# Patient Record
Sex: FEMALE | Race: WHITE | NOT HISPANIC OR LATINO | Employment: UNEMPLOYED | URBAN - NONMETROPOLITAN AREA
[De-identification: names, ages, dates, MRNs, and addresses within clinical notes are randomized per-mention and may not be internally consistent; named-entity substitution may affect disease eponyms.]

---

## 2018-07-25 PROCEDURE — 88141 CYTOPATH C/V INTERPRET: CPT | Performed by: PATHOLOGY

## 2018-07-25 PROCEDURE — G0123 SCREEN CERV/VAG THIN LAYER: HCPCS

## 2018-07-25 PROCEDURE — G0123 SCREEN CERV/VAG THIN LAYER: HCPCS | Performed by: NURSE PRACTITIONER

## 2018-07-27 ENCOUNTER — LAB REQUISITION (OUTPATIENT)
Dept: LAB | Facility: HOSPITAL | Age: 33
End: 2018-07-27

## 2018-07-27 DIAGNOSIS — Z01.419 ENCOUNTER FOR GYNECOLOGICAL EXAMINATION WITHOUT ABNORMAL FINDING: ICD-10-CM

## 2018-07-30 LAB
LAB AP CASE REPORT: NORMAL
LAB AP GYN ADDITIONAL INFORMATION: NORMAL
LAB AP GYN OTHER FINDINGS: NORMAL
LAB AP LMP: NORMAL
PATH INTERP SPEC-IMP: NORMAL
STAT OF ADQ CVX/VAG CYTO-IMP: NORMAL

## 2018-09-20 ENCOUNTER — OFFICE VISIT (OUTPATIENT)
Dept: GASTROENTEROLOGY | Facility: CLINIC | Age: 33
End: 2018-09-20

## 2018-09-20 VITALS
HEART RATE: 81 BPM | SYSTOLIC BLOOD PRESSURE: 112 MMHG | WEIGHT: 117 LBS | HEIGHT: 63 IN | DIASTOLIC BLOOD PRESSURE: 76 MMHG | BODY MASS INDEX: 20.73 KG/M2 | OXYGEN SATURATION: 98 %

## 2018-09-20 DIAGNOSIS — Z80.0 FAMILY HISTORY OF COLON CANCER IN MOTHER: ICD-10-CM

## 2018-09-20 DIAGNOSIS — K58.0 IRRITABLE BOWEL SYNDROME WITH DIARRHEA: ICD-10-CM

## 2018-09-20 DIAGNOSIS — Z12.11 ENCOUNTER FOR SCREENING FOR MALIGNANT NEOPLASM OF COLON: Primary | ICD-10-CM

## 2018-09-20 PROCEDURE — 99214 OFFICE O/P EST MOD 30 MIN: CPT | Performed by: NURSE PRACTITIONER

## 2018-09-20 RX ORDER — DEXTROSE AND SODIUM CHLORIDE 5; .45 G/100ML; G/100ML
30 INJECTION, SOLUTION INTRAVENOUS CONTINUOUS PRN
Status: CANCELLED | OUTPATIENT
Start: 2018-11-06

## 2018-09-20 RX ORDER — HYOSCYAMINE SULFATE 0.125 MG
0.12 TABLET ORAL
Qty: 120 TABLET | Refills: 5 | Status: SHIPPED | OUTPATIENT
Start: 2018-09-20 | End: 2019-01-11

## 2018-09-20 NOTE — PROGRESS NOTES
Chief Complaint   Patient presents with   • Colon Cancer Screening       Subjective    Gisella Aldana is a 33 y.o. female. she is here today for follow-up.    History of Present Illness  33-year-old female presents to discuss screening colonoscopy due to strong family history of colon cancer.  Her mother was diagnosed at age 34.  She denies any abdominal pain nausea or vomiting.  Reports she has ear bowel syndrome and has diarrhea after every meal.  She denies any melena or hematochezia.  She denies any heartburn reflux or dysphagia.  Her weight is stable.  Plan; we'll start patient on Levsin for meals and bedtime for irritable bowel syndrome diarrhea predominant.  Schedule patient for screening colonoscopy due to family history of colon cancer in her mother.  Follow-up after test return to office sooner if needed.       The following portions of the patient's history were reviewed and updated as appropriate:   Past Medical History:   Diagnosis Date   • Encounter for  visit      visit status      • Surgical follow-up care    • Vomiting of pregnancy    • Vomiting of pregnancy, unspecified     Unspecified vomiting of pregnancy, antepartum condition or complication        Past Surgical History:   Procedure Laterality Date   •  SECTION  2015     Repeat low transverse  section.      Family History   Problem Relation Age of Onset   • Cancer Mother         colon cancer   • Cancer Father         Lung cancer   • Hypertension Paternal Grandmother    • Cancer Maternal Grandmother         lymphoma   • Heart disease Paternal Grandfather      OB History      Para Term  AB Living    2 2       2    SAB TAB Ectopic Molar Multiple Live Births                       Current Outpatient Prescriptions   Medication Sig Dispense Refill   • hyoscyamine (ANASPAZ,LEVSIN) 0.125 MG tablet Take 1 tablet by mouth 4 (Four) Times a Day With Meals & at Bedtime. 120 tablet 5   • polyethylene  "glycol (GoLYTELY) 236 g solution Starting at noon on day prior to procedure, drink 8 ounces every 30 minutes until all gone or stools are clear. May add flavor packet. 4000 mL 0     No current facility-administered medications for this visit.      No Known Allergies  Social History     Social History   • Marital status:      Social History Main Topics   • Smoking status: Never Smoker   • Smokeless tobacco: Never Used   • Alcohol use No   • Drug use: No   • Sexual activity: Yes     Birth control/ protection: None     Other Topics Concern   • Not on file       Review of Systems  Review of Systems   Constitutional: Negative for activity change, appetite change, chills, diaphoresis, fatigue, fever and unexpected weight change.   HENT: Negative for sore throat and trouble swallowing.    Respiratory: Negative for shortness of breath.    Gastrointestinal: Positive for diarrhea (5x per day after meals ). Negative for abdominal distention, abdominal pain, anal bleeding, blood in stool, constipation, nausea, rectal pain and vomiting.   Musculoskeletal: Negative for arthralgias.   Skin: Negative for pallor.   Neurological: Negative for light-headedness.        /76   Pulse 81   Ht 160 cm (63\")   Wt 53.1 kg (117 lb)   SpO2 98%   BMI 20.73 kg/m²     Objective    Physical Exam   Constitutional: She is oriented to person, place, and time. She appears well-developed and well-nourished. She is cooperative. No distress.   HENT:   Head: Normocephalic and atraumatic.   Neck: Normal range of motion. Neck supple. No thyromegaly present.   Cardiovascular: Normal rate, regular rhythm and normal heart sounds.    Pulmonary/Chest: Effort normal and breath sounds normal. She has no wheezes. She has no rhonchi. She has no rales.   Abdominal: Soft. Normal appearance and bowel sounds are normal. She exhibits no distension. There is no hepatosplenomegaly. There is no tenderness. There is no rigidity and no guarding. No hernia. "   Lymphadenopathy:     She has no cervical adenopathy.   Neurological: She is alert and oriented to person, place, and time.   Skin: Skin is warm, dry and intact. No rash noted. No pallor.   Psychiatric: She has a normal mood and affect. Her speech is normal.     Lab Requisition on 07/25/2018   Component Date Value Ref Range Status   • Case Report 07/25/2018    Final                    Value:Gynecologic Cytology Report                       Case: [id number]                                  Authorizing Provider:  Submitter Client           Collected:           07/25/2018 08:40 AM          First Screen:          Alexa Reagan         Received:            07/27/2018 08:41 AM          Pathologist:           Herson Dunham MD                                                          Specimen:    Liquid-Based Pap, Screening, Cervix, Endocervix                                           • Interpretation 07/25/2018 Negative for intraepithelial lesion or malignancy    Final   • Other Findings 07/25/2018 Reactive cellular changes  Fungal organisms morphologically consistent with Candida spp  Mild inflammation   Final   • Specimen Adequacy 07/25/2018 Satisfactory for evaluation, endocervical/transformation zone component present   Final   • LMP 07/25/2018 07-   Final   • Additional Information 07/25/2018    Final                    Value:This result contains rich text formatting which cannot be displayed here.     Assessment/Plan      1. Encounter for screening for malignant neoplasm of colon    2. Family history of colon cancer in mother    3. Irritable bowel syndrome with diarrhea    .       Orders placed during this encounter include:  Orders Placed This Encounter   Procedures   • Follow Anesthesia Guidelines / Standing Orders     Standing Status:   Future       COLONOSCOPY (N/A)    Review and/or summary of lab tests, radiology, procedures, medications. Review and summary of old records and obtaining of history.  The risks and benefits of my recommendations, as well as other treatment options were discussed with the patient today. Questions were answered.    New Medications Ordered This Visit   Medications   • hyoscyamine (ANASPAZ,LEVSIN) 0.125 MG tablet     Sig: Take 1 tablet by mouth 4 (Four) Times a Day With Meals & at Bedtime.     Dispense:  120 tablet     Refill:  5   • polyethylene glycol (GoLYTELY) 236 g solution     Sig: Starting at noon on day prior to procedure, drink 8 ounces every 30 minutes until all gone or stools are clear. May add flavor packet.     Dispense:  4000 mL     Refill:  0       Follow-up: Return in about 4 weeks (around 10/18/2018).          This document has been electronically signed by CANDE Vogel on September 20, 2018 2:08 PM             Results for orders placed or performed in visit on 07/25/18   Liquid-based Pap Smear, Screening   Result Value Ref Range    Case Report       Gynecologic Cytology Report                       Case: [id number]                                  Authorizing Provider:  Submitter Client           Collected:           07/25/2018 08:40 AM          First Screen:          Alexa Reagan         Received:            07/27/2018 08:41 AM          Pathologist:           Herson Dunhma MD                                                          Specimen:    Liquid-Based Pap, Screening, Cervix, Endocervix                                            Interpretation Negative for intraepithelial lesion or malignancy      Other Findings       Reactive cellular changes  Fungal organisms morphologically consistent with Candida spp  Mild inflammation    Specimen Adequacy       Satisfactory for evaluation, endocervical/transformation zone component present    [facility] 07-     Additional Information       Disclaimer: Cervical cytology is a screening test primarily for squamous cancer and its precursors and has associated false-negative and false-positive results.   Technologies such as liquid-based preparations may decrease but will not eliminate all false-negative results.  Follow-up of unexplained clinical signs and symptoms is recommended to minimize false-negative results. (The [place] System for Reporting Cervical Cytology: Gonzalez, 2015).     Results for orders placed or performed during the hospital encounter of 05/11/15   Cannabinoid Confirmation, Ur   Result Value Ref Range    Please note Comment     THC, Urine Positive (A)     Carboxy THC, Urine 83 CUTOFF=10 ng/mL   Hemoglobin and hematocrit, blood   Result Value Ref Range    Hemoglobin 9.8 (L) 12.0 - 15.5 gm/dl    Hematocrit 27.8 (L) 35.0 - 45.0 %   Rapid drug screen, urine   Result Value Ref Range    Barbiturates Screen, Urine Negative NEGATIVE    Benzodiazepine Screen, Urine Negative NEGATIVE    Opiate Screen, Urine Negative NEGATIVE    THC Screen Interpretation POSITIVE (A) NEGATIVE    Amphet/Methamphet, Screen, Urine Negative NEGATIVE    Cocaine Screen, Urine Negative NEGATIVE    Oxycodone Screen, Urine Negative NEGATIVE    Methadone Screen, Urine Negative NEGATIVE   RPR   Result Value Ref Range    RPR Non Reactive Non Reactive   CBC and Differential   Result Value Ref Range    WBC 16.1 (H) 3.2 - 9.8 x1000/uL    RBC 4.17 3.77 - 5.16 amelia/mm3    Hemoglobin 11.9 (L) 12.0 - 15.5 gm/dl    Hematocrit 34.6 (L) 35.0 - 45.0 %    MCV 83.0 80.0 - 98.0 fl    MCH 28.5 26.0 - 34.0 pg    MCHC 34.4 31.4 - 36.0 gm/dl    RDW 14.0 11.5 - 14.5 %    Platelets 319 150 - 450 x1000/mm3    MPV 11.8 8.0 - 12.0 fl    Neutrophil Rel % 67.4 37.0 - 80.0 %    Lymphocyte Rel % 25.3 10.0 - 50.0 %    Monocyte Rel % 5.1 0.0 - 12.0 %    Eosinophil Rel % 1.3 0.0 - 7.0 %    Basophil Rel % 0.2 0.0 - 2.0 %    Immature Granulocyte Rel % 0.70 (H) 0.00 - 0.50 %    Neutrophils Absolute 10.87 (H) 2.00 - 8.60 x1000/uL    Lymphocytes Absolute 4.09 0.60 - 4.20 x1000/uL    Monocytes Absolute 0.82 0.00 - 0.90 x1000/uL    Eosinophils Absolute 0.21 0.00 - 0.70  x1000/uL    Basophils Absolute 0.03 0.00 - 0.20 x1000/uL    Immature Granulocytes Absolute 0.120 (H) 0.005 - 0.022 x1000/uL   Type and screen   Result Value Ref Range    DOES A PREVIOUS ABORH EXIST? PREVIOUS TYPE ON FILE     ABO Type B     RH type POS     Antibody Screen Interpretation NEG    Results for orders placed or performed in visit on 02/09/15   Glucose, Post Prandial 1 Hr   Result Value Ref Range    Glucose, 1Hr  60 - 140 mg/dl   CBC and Differential   Result Value Ref Range    WBC 15.9 (H) 3.2 - 9.8 x1000/uL    RBC 3.67 (L) 3.77 - 5.16 amelia/mm3    Hemoglobin 11.2 (L) 12.0 - 15.5 gm/dl    Hematocrit 32.8 (L) 35.0 - 45.0 %    MCV 89.4 80.0 - 98.0 fl    MCH 30.5 26.0 - 34.0 pg    MCHC 34.1 31.4 - 36.0 gm/dl    RDW 13.0 11.5 - 14.5 %    Platelets 305 150 - 450 x1000/mm3    MPV 11.5 8.0 - 12.0 fl    Neutrophil Rel % 75.4 37.0 - 80.0 %    Lymphocyte Rel % 17.7 10.0 - 50.0 %    Monocyte Rel % 4.7 0.0 - 12.0 %    Eosinophil Rel % 1.6 0.0 - 7.0 %    Basophil Rel % 0.1 0.0 - 2.0 %    Immature Granulocyte Rel % 0.50 0.00 - 0.50 %    Neutrophils Absolute 11.97 (H) 2.00 - 8.60 x1000/uL    Lymphocytes Absolute 2.81 0.60 - 4.20 x1000/uL    Monocytes Absolute 0.74 0.00 - 0.90 x1000/uL    Eosinophils Absolute 0.25 0.00 - 0.70 x1000/uL    Basophils Absolute 0.02 0.00 - 0.20 x1000/uL    Immature Granulocytes Absolute 0.080 (H) 0.005 - 0.022 x1000/uL   Results for orders placed or performed in visit on 10/10/14   Cannabinoid Confirmation, Ur   Result Value Ref Range    Please note Comment     THC, Urine Positive (A)     Carboxy THC, Urine >300 CUTOFF=10 ng/mL   Urinalysis, Microscopic only   Result Value Ref Range    WBC, UA 10-15 (A) 0-5,0,RARE,1-2,2-4,3-5  /HPF    RBC, UA 0-2 0-2,0,RARE  /HPF    Bacteria, UA TRACE 0,TRACE  /HPF    Mucus, UA 1+     Epithelial Cells, UA Rare Squamous  /HPF    Crystals, Joint Fluid 1+ Amorphous Phosphates (C)  /HPF   Hepatitis C antibody   Result Value Ref Range    Hep C Virus Ab  Negative NEGATIVE   Rapid drug screen, urine   Result Value Ref Range    Barbiturates Screen, Urine Negative NEGATIVE    Benzodiazepine Screen, Urine Negative NEGATIVE    Opiate Screen, Urine Negative NEGATIVE    THC Screen Interpretation POSITIVE (A) NEGATIVE    Amphet/Methamphet, Screen, Urine Negative NEGATIVE    Cocaine Screen, Urine Negative NEGATIVE    Oxycodone Screen, Urine Negative NEGATIVE    Methadone Screen, Urine Negative NEGATIVE   Rubella antibody, IgG   Result Value Ref Range    Rubella IgG Quant Immune IMMUNE   RPR   Result Value Ref Range    RPR Non Reactive Non Reactive   HIV-1 and HIV-2 antibodies   Result Value Ref Range    HIV-1/ HIV-2 Non Reactive NON REACTIVE   Hepatitis B surface antigen   Result Value Ref Range    Hepatitis B Surface Ag Negative NEGATIVE   Urinalysis with microscopic   Result Value Ref Range    Color, UA CLAY     Appearance TURBID     Specific Gravity, UA 1.022 1.003 - 1.030    pH, UA 7.5 pH Units    Leukocytes, UA 1+ (A) NEGATIVE    Nitrite, UA NEGATIVE NEGATIVE    Protein, UA 1+ (A) NEGATIVE    Glucose, Urine NEGATIVE NEGATIVE mg/dl    Ketones, UA TRACE (A) NEGATIVE    Urobilinogen, UA 0.2 0.2 EU/dl    Blood, UA NEGATIVE NEGATIVE     *Note: Due to a large number of results and/or encounters for the requested time period, some results have not been displayed. A complete set of results can be found in Results Review.

## 2018-09-20 NOTE — PATIENT INSTRUCTIONS
Diet for Irritable Bowel Syndrome  When you have irritable bowel syndrome (IBS), the foods you eat and your eating habits are very important. IBS may cause various symptoms, such as abdominal pain, constipation, or diarrhea. Choosing the right foods can help ease discomfort caused by these symptoms. Work with your health care provider and dietitian to find the best eating plan to help control your symptoms.  What general guidelines do I need to follow?  · Keep a food diary. This will help you identify foods that cause symptoms. Write down:  ? What you eat and when.  ? What symptoms you have.  ? When symptoms occur in relation to your meals.  · Avoid foods that cause symptoms. Talk with your dietitian about other ways to get the same nutrients that are in these foods.  · Eat more foods that contain fiber. Take a fiber supplement if directed by your dietitian.  · Eat your meals slowly, in a relaxed setting.  · Aim to eat 5-6 small meals per day. Do not skip meals.  · Drink enough fluids to keep your urine clear or pale yellow.  · Ask your health care provider if you should take an over-the-counter probiotic during flare-ups to help restore healthy gut bacteria.  · If you have cramping or diarrhea, try making your meals low in fat and high in carbohydrates. Examples of carbohydrates are pasta, rice, whole grain breads and cereals, fruits, and vegetables.  · If dairy products cause your symptoms to flare up, try eating less of them. You might be able to handle yogurt better than other dairy products because it contains bacteria that help with digestion.  What foods are not recommended?  The following are some foods and drinks that may worsen your symptoms:  · Fatty foods, such as French fries.  · Milk products, such as cheese or ice cream.  · Chocolate.  · Alcohol.  · Products with caffeine, such as coffee.  · Carbonated drinks, such as soda.    The items listed above may not be a complete list of foods and beverages to  avoid. Contact your dietitian for more information.  What foods are good sources of fiber?  Your health care provider or dietitian may recommend that you eat more foods that contain fiber. Fiber can help reduce constipation and other IBS symptoms. Add foods with fiber to your diet a little at a time so that your body can get used to them. Too much fiber at once might cause gas and swelling of your abdomen. The following are some foods that are good sources of fiber:  · Apples.  · Peaches.  · Pears.  · Berries.  · Figs.  · Broccoli (raw).  · Cabbage.  · Carrots.  · Raw peas.  · Kidney beans.  · Lima beans.  · Whole grain bread.  · Whole grain cereal.    Where to find more information:  International Foundation for Functional Gastrointestinal Disorders: www.iffgd.org  National [place] of Diabetes and Digestive and Kidney Diseases: www.niddk.nih.gov/health-information/health-topics/digestive-diseases/ibs/Pages/facts.aspx  This information is not intended to replace advice given to you by your health care provider. Make sure you discuss any questions you have with your health care provider.  Document Released: 03/09/2005 Document Revised: 05/25/2017 Document Reviewed: 03/20/2015  [employer] Interactive Patient Education © 2018 [employer]

## 2018-11-07 ENCOUNTER — ANESTHESIA EVENT (OUTPATIENT)
Dept: GASTROENTEROLOGY | Facility: HOSPITAL | Age: 33
End: 2018-11-07

## 2018-11-07 ENCOUNTER — HOSPITAL ENCOUNTER (OUTPATIENT)
Facility: HOSPITAL | Age: 33
Setting detail: HOSPITAL OUTPATIENT SURGERY
Discharge: HOME | End: 2018-11-07
Attending: INTERNAL MEDICINE | Admitting: INTERNAL MEDICINE

## 2018-11-07 ENCOUNTER — ANESTHESIA (OUTPATIENT)
Dept: GASTROENTEROLOGY | Facility: HOSPITAL | Age: 33
End: 2018-11-07

## 2018-11-07 VITALS
OXYGEN SATURATION: 96 % | SYSTOLIC BLOOD PRESSURE: 102 MMHG | DIASTOLIC BLOOD PRESSURE: 65 MMHG | HEIGHT: 63 IN | RESPIRATION RATE: 20 BRPM | TEMPERATURE: 96.9 F | HEART RATE: 72 BPM | WEIGHT: 117.95 LBS | BODY MASS INDEX: 20.9 KG/M2

## 2018-11-07 DIAGNOSIS — Z80.0 FAMILY HISTORY OF COLON CANCER IN MOTHER: ICD-10-CM

## 2018-11-07 DIAGNOSIS — Z12.11 ENCOUNTER FOR SCREENING FOR MALIGNANT NEOPLASM OF COLON: ICD-10-CM

## 2018-11-07 LAB — B-HCG UR QL: NEGATIVE

## 2018-11-07 PROCEDURE — 25010000002 PROPOFOL 10 MG/ML EMULSION: Performed by: NURSE ANESTHETIST, CERTIFIED REGISTERED

## 2018-11-07 PROCEDURE — 81025 URINE PREGNANCY TEST: CPT | Performed by: INTERNAL MEDICINE

## 2018-11-07 PROCEDURE — 45378 DIAGNOSTIC COLONOSCOPY: CPT | Performed by: INTERNAL MEDICINE

## 2018-11-07 RX ORDER — DEXTROSE AND SODIUM CHLORIDE 5; .45 G/100ML; G/100ML
30 INJECTION, SOLUTION INTRAVENOUS CONTINUOUS PRN
Status: DISCONTINUED | OUTPATIENT
Start: 2018-11-07 | End: 2018-11-07 | Stop reason: HOSPADM

## 2018-11-07 RX ORDER — LIDOCAINE HYDROCHLORIDE 20 MG/ML
INJECTION, SOLUTION INFILTRATION; PERINEURAL AS NEEDED
Status: DISCONTINUED | OUTPATIENT
Start: 2018-11-07 | End: 2018-11-07 | Stop reason: SURG

## 2018-11-07 RX ORDER — PROPOFOL 10 MG/ML
VIAL (ML) INTRAVENOUS AS NEEDED
Status: DISCONTINUED | OUTPATIENT
Start: 2018-11-07 | End: 2018-11-07 | Stop reason: SURG

## 2018-11-07 RX ADMIN — DEXTROSE AND SODIUM CHLORIDE 30 ML/HR: 5; 450 INJECTION, SOLUTION INTRAVENOUS at 09:30

## 2018-11-07 RX ADMIN — PROPOFOL 20 MG: 10 INJECTION, EMULSION INTRAVENOUS at 10:31

## 2018-11-07 RX ADMIN — PROPOFOL 20 MG: 10 INJECTION, EMULSION INTRAVENOUS at 10:37

## 2018-11-07 RX ADMIN — DEXTROSE AND SODIUM CHLORIDE: 5; 450 INJECTION, SOLUTION INTRAVENOUS at 10:18

## 2018-11-07 RX ADMIN — PROPOFOL 20 MG: 10 INJECTION, EMULSION INTRAVENOUS at 10:35

## 2018-11-07 RX ADMIN — PROPOFOL 20 MG: 10 INJECTION, EMULSION INTRAVENOUS at 10:33

## 2018-11-07 RX ADMIN — LIDOCAINE HYDROCHLORIDE 100 MG: 20 INJECTION, SOLUTION INFILTRATION; PERINEURAL at 10:29

## 2018-11-07 RX ADMIN — PROPOFOL 20 MG: 10 INJECTION, EMULSION INTRAVENOUS at 10:39

## 2018-11-07 RX ADMIN — PROPOFOL 70 MG: 10 INJECTION, EMULSION INTRAVENOUS at 10:29

## 2018-11-07 NOTE — H&P
Progress Notes  Encounter Date: 2018  Kristofer gregorio  Gastroenterology   Expand All Collapse All    []Manual[]Template  []Copied      Chief Complaint   Patient presents with   • Colon Cancer Screening            Subjective       Gisella Aldana is a 33 y.o. female. she is here today for follow-up.     History of Present Illness  33-year-old female presents to discuss screening colonoscopy due to strong family history of colon cancer.  Her mother was diagnosed at age 34.  She denies any abdominal pain nausea or vomiting.  Reports she has ear bowel syndrome and has diarrhea after every meal.  She denies any melena or hematochezia.  She denies any heartburn reflux or dysphagia.  Her weight is stable.  Plan; we'll start patient on Levsin for meals and bedtime for irritable bowel syndrome diarrhea predominant.  Schedule patient for screening colonoscopy due to family history of colon cancer in her mother.  Follow-up after test return to office sooner if needed.        The following portions of the patient's history were reviewed and updated as appropriate:   Medical History        Past Medical History:   Diagnosis Date   • Encounter for  visit        visit status      • Surgical follow-up care     • Vomiting of pregnancy     • Vomiting of pregnancy, unspecified       Unspecified vomiting of pregnancy, antepartum condition or complication            Surgical History         Past Surgical History:   Procedure Laterality Date   •  SECTION   2015      Repeat low transverse  section.                Family History   Problem Relation Age of Onset   • Cancer Mother           colon cancer   • Cancer Father           Lung cancer   • Hypertension Paternal Grandmother     • Cancer Maternal Grandmother           lymphoma   • Heart disease Paternal Grandfather                 OB History       Para Term  AB Living     2 2       2     SAB TAB Ectopic Molar Multiple Live  "Births                                 Current Outpatient Prescriptions   Medication Sig Dispense Refill   • hyoscyamine (ANASPAZ,LEVSIN) 0.125 MG tablet Take 1 tablet by mouth 4 (Four) Times a Day With Meals & at Bedtime. 120 tablet 5   • polyethylene glycol (GoLYTELY) 236 g solution Starting at noon on day prior to procedure, drink 8 ounces every 30 minutes until all gone or stools are clear. May add flavor packet. 4000 mL 0      No current facility-administered medications for this visit.       No Known Allergies  Social History   Social History           Social History   • Marital status:             Social History Main Topics   • Smoking status: Never Smoker   • Smokeless tobacco: Never Used   • Alcohol use No   • Drug use: No   • Sexual activity: Yes       Birth control/ protection: None           Other Topics Concern   • Not on file            Review of Systems  Review of Systems   Constitutional: Negative for activity change, appetite change, chills, diaphoresis, fatigue, fever and unexpected weight change.   HENT: Negative for sore throat and trouble swallowing.    Respiratory: Negative for shortness of breath.    Gastrointestinal: Positive for diarrhea (5x per day after meals ). Negative for abdominal distention, abdominal pain, anal bleeding, blood in stool, constipation, nausea, rectal pain and vomiting.   Musculoskeletal: Negative for arthralgias.   Skin: Negative for pallor.   Neurological: Negative for light-headedness.                    /76   Pulse 81   Ht 160 cm (63\")   Wt 53.1 kg (117 lb)   SpO2 98%   BMI 20.73 kg/m²         Objective       Physical Exam   Constitutional: She is oriented to person, place, and time. She appears well-developed and well-nourished. She is cooperative. No distress.   HENT:   Head: Normocephalic and atraumatic.   Neck: Normal range of motion. Neck supple. No thyromegaly present.   Cardiovascular: Normal rate, regular rhythm and normal heart sounds.  "   Pulmonary/Chest: Effort normal and breath sounds normal. She has no wheezes. She has no rhonchi. She has no rales.   Abdominal: Soft. Normal appearance and bowel sounds are normal. She exhibits no distension. There is no hepatosplenomegaly. There is no tenderness. There is no rigidity and no guarding. No hernia.   Lymphadenopathy:     She has no cervical adenopathy.   Neurological: She is alert and oriented to person, place, and time.   Skin: Skin is warm, dry and intact. No rash noted. No pallor.   Psychiatric: She has a normal mood and affect. Her speech is normal.               Lab Requisition on 07/25/2018   Component Date Value Ref Range Status    • Case Report 07/25/2018     Final                    Value:Gynecologic Cytology Report                       Case: [id number]                                  Authorizing Provider:  Submitter Client           Collected:           07/25/2018 08:40 AM          First Screen:          Alexa Reagan         Received:            07/27/2018 08:41 AM          Pathologist:           Herson Dunham MD                                                          Specimen:    Liquid-Based Pap, Screening, Cervix, Endocervix                                            • Interpretation 07/25/2018 Negative for intraepithelial lesion or malignancy    Final   • Other Findings 07/25/2018 Reactive cellular changes  Fungal organisms morphologically consistent with Candida spp  Mild inflammation    Final   • Specimen Adequacy 07/25/2018 Satisfactory for evaluation, endocervical/transformation zone component present    Final   • LMP 07/25/2018 07-    Final   • Additional Information 07/25/2018     Final                    Value:This result contains rich text formatting which cannot be displayed here.         Assessment/Plan          1. Encounter for screening for malignant neoplasm of colon    2. Family history of colon cancer in mother    3. Irritable bowel syndrome with  diarrhea    .         Orders placed during this encounter include:        Orders Placed This Encounter   Procedures   • Follow Anesthesia Guidelines / Standing Orders       Standing Status:   Future         COLONOSCOPY (N/A)     Review and/or summary of lab tests, radiology, procedures, medications. Review and summary of old records and obtaining of history. The risks and benefits of my recommendations, as well as other treatment options were discussed with the patient today. Questions were answered.          New Medications Ordered This Visit   Medications   • hyoscyamine (ANASPAZ,LEVSIN) 0.125 MG tablet       Sig: Take 1 tablet by mouth 4 (Four) Times a Day With Meals & at Bedtime.       Dispense:  120 tablet       Refill:  5   • polyethylene glycol (GoLYTELY) 236 g solution       Sig: Starting at noon on day prior to procedure, drink 8 ounces every 30 minutes until all gone or stools are clear. May add flavor packet.       Dispense:  4000 mL       Refill:  0               This document has been electronically signed by Kristofer Maldonado MD on November 7, 2018 9:35 AM                      Results for orders placed or performed in visit on 07/25/18   Liquid-based Pap Smear, Screening   Result Value Ref Range     Case Report           Gynecologic Cytology Report                       Case: [id number]                                  Authorizing Provider:  Submitter Client           Collected:           07/25/2018 08:40 AM          First Screen:          Alexa Reagan         Received:            07/27/2018 08:41 AM          Pathologist:           Herson Dunham MD                                                          Specimen:    Liquid-Based Pap, Screening, Cervix, Endocervix                                              Interpretation Negative for intraepithelial lesion or malignancy       Other Findings           Reactive cellular changes  Fungal organisms morphologically consistent with Candida spp  Mild  inflammation     Specimen Adequacy           Satisfactory for evaluation, endocervical/transformation zone component present     LMP 07-       Additional Information           Disclaimer: Cervical cytology is a screening test primarily for squamous cancer and its precursors and has associated false-negative and false-positive results.  Technologies such as liquid-based preparations may decrease but will not eliminate all false-negative results.  Follow-up of unexplained clinical signs and symptoms is recommended to minimize false-negative results. (The [place] System for Reporting Cervical Cytology: Gonzalez, 2015).      Results for orders placed or performed during the hospital encounter of 05/11/15   Cannabinoid Confirmation, Ur   Result Value Ref Range     Please note Comment       THC, Urine Positive (A)       Carboxy THC, Urine 83 CUTOFF=10 ng/mL   Hemoglobin and hematocrit, blood   Result Value Ref Range     Hemoglobin 9.8 (L) 12.0 - 15.5 gm/dl     Hematocrit 27.8 (L) 35.0 - 45.0 %   Rapid drug screen, urine   Result Value Ref Range     Barbiturates Screen, Urine Negative NEGATIVE     Benzodiazepine Screen, Urine Negative NEGATIVE     Opiate Screen, Urine Negative NEGATIVE     THC Screen Interpretation POSITIVE (A) NEGATIVE     Amphet/Methamphet, Screen, Urine Negative NEGATIVE     Cocaine Screen, Urine Negative NEGATIVE     Oxycodone Screen, Urine Negative NEGATIVE     Methadone Screen, Urine Negative NEGATIVE   RPR   Result Value Ref Range     RPR Non Reactive Non Reactive   CBC and Differential   Result Value Ref Range     WBC 16.1 (H) 3.2 - 9.8 x1000/uL     RBC 4.17 3.77 - 5.16 amelia/mm3     Hemoglobin 11.9 (L) 12.0 - 15.5 gm/dl     Hematocrit 34.6 (L) 35.0 - 45.0 %     MCV 83.0 80.0 - 98.0 fl     MCH 28.5 26.0 - 34.0 pg     MCHC 34.4 31.4 - 36.0 gm/dl     RDW 14.0 11.5 - 14.5 %     Platelets 319 150 - 450 x1000/mm3     MPV 11.8 8.0 - 12.0 fl     Neutrophil Rel % 67.4 37.0 - 80.0 %     Lymphocyte  Rel % 25.3 10.0 - 50.0 %     Monocyte Rel % 5.1 0.0 - 12.0 %     Eosinophil Rel % 1.3 0.0 - 7.0 %     Basophil Rel % 0.2 0.0 - 2.0 %     Immature Granulocyte Rel % 0.70 (H) 0.00 - 0.50 %     Neutrophils Absolute 10.87 (H) 2.00 - 8.60 x1000/uL     Lymphocytes Absolute 4.09 0.60 - 4.20 x1000/uL     Monocytes Absolute 0.82 0.00 - 0.90 x1000/uL     Eosinophils Absolute 0.21 0.00 - 0.70 x1000/uL     Basophils Absolute 0.03 0.00 - 0.20 x1000/uL     Immature Granulocytes Absolute 0.120 (H) 0.005 - 0.022 x1000/uL   Type and screen   Result Value Ref Range     DOES A PREVIOUS ABORH EXIST? PREVIOUS TYPE ON FILE       ABO Type B       RH type POS       Antibody Screen Interpretation NEG     Results for orders placed or performed in visit on 02/09/15   Glucose, Post Prandial 1 Hr   Result Value Ref Range     Glucose, 1Hr  60 - 140 mg/dl   CBC and Differential   Result Value Ref Range     WBC 15.9 (H) 3.2 - 9.8 x1000/uL     RBC 3.67 (L) 3.77 - 5.16 amelia/mm3     Hemoglobin 11.2 (L) 12.0 - 15.5 gm/dl     Hematocrit 32.8 (L) 35.0 - 45.0 %     MCV 89.4 80.0 - 98.0 fl     MCH 30.5 26.0 - 34.0 pg     MCHC 34.1 31.4 - 36.0 gm/dl     RDW 13.0 11.5 - 14.5 %     Platelets 305 150 - 450 x1000/mm3     MPV 11.5 8.0 - 12.0 fl     Neutrophil Rel % 75.4 37.0 - 80.0 %     Lymphocyte Rel % 17.7 10.0 - 50.0 %     Monocyte Rel % 4.7 0.0 - 12.0 %     Eosinophil Rel % 1.6 0.0 - 7.0 %     Basophil Rel % 0.1 0.0 - 2.0 %     Immature Granulocyte Rel % 0.50 0.00 - 0.50 %     Neutrophils Absolute 11.97 (H) 2.00 - 8.60 x1000/uL     Lymphocytes Absolute 2.81 0.60 - 4.20 x1000/uL     Monocytes Absolute 0.74 0.00 - 0.90 x1000/uL     Eosinophils Absolute 0.25 0.00 - 0.70 x1000/uL     Basophils Absolute 0.02 0.00 - 0.20 x1000/uL     Immature Granulocytes Absolute 0.080 (H) 0.005 - 0.022 x1000/uL   Results for orders placed or performed in visit on 10/10/14   Cannabinoid Confirmation, Ur   Result Value Ref Range     Please note Comment       THC, Urine  Positive (A)       Carboxy THC, Urine >300 CUTOFF=10 ng/mL   Urinalysis, Microscopic only   Result Value Ref Range     WBC, UA 10-15 (A) 0-5,0,RARE,1-2,2-4,3-5  /HPF     RBC, UA 0-2 0-2,0,RARE  /HPF     Bacteria, UA TRACE 0,TRACE  /HPF     Mucus, UA 1+       Epithelial Cells, UA Rare Squamous  /HPF     Crystals, Joint Fluid 1+ Amorphous Phosphates (C)  /HPF   Hepatitis C antibody   Result Value Ref Range     Hep C Virus Ab Negative NEGATIVE   Rapid drug screen, urine   Result Value Ref Range     Barbiturates Screen, Urine Negative NEGATIVE     Benzodiazepine Screen, Urine Negative NEGATIVE     Opiate Screen, Urine Negative NEGATIVE     THC Screen Interpretation POSITIVE (A) NEGATIVE     Amphet/Methamphet, Screen, Urine Negative NEGATIVE     Cocaine Screen, Urine Negative NEGATIVE     Oxycodone Screen, Urine Negative NEGATIVE     Methadone Screen, Urine Negative NEGATIVE   Rubella antibody, IgG   Result Value Ref Range     Rubella IgG Quant Immune IMMUNE   RPR   Result Value Ref Range     RPR Non Reactive Non Reactive   HIV-1 and HIV-2 antibodies   Result Value Ref Range     HIV-1/ HIV-2 Non Reactive NON REACTIVE   Hepatitis B surface antigen   Result Value Ref Range     Hepatitis B Surface Ag Negative NEGATIVE   Urinalysis with microscopic   Result Value Ref Range     Color, UA CLAY       Appearance TURBID       Specific Gravity, UA 1.022 1.003 - 1.030     pH, UA 7.5 pH Units     Leukocytes, UA 1+ (A) NEGATIVE     Nitrite, UA NEGATIVE NEGATIVE     Protein, UA 1+ (A) NEGATIVE     Glucose, Urine NEGATIVE NEGATIVE mg/dl     Ketones, UA TRACE (A) NEGATIVE     Urobilinogen, UA 0.2 0.2 EU/dl     Blood, UA NEGATIVE NEGATIVE      *Note: Due to a large number of results and/or encounters for the requested time period, some results have not been displayed. A complete set of results can be found in Results Review.              Office Visit on 9/20/2018            Detailed Report

## 2018-11-07 NOTE — ANESTHESIA POSTPROCEDURE EVALUATION
Patient: Gisella Aldana    Procedure Summary     Date:  11/07/18 Room / Location:  [facility] ENDOSCOPY 3 / [facility] ENDOSCOPY    Anesthesia Start:  1018 Anesthesia Stop:  1039    Procedure:  COLONOSCOPY (N/A ) Diagnosis:       Encounter for screening for malignant neoplasm of colon      Family history of colon cancer in mother      (Encounter for screening for malignant neoplasm of colon [Z12.11])      (Family history of colon cancer in mother [Z80.0])    Surgeon:  Kristofer Maldonado MD Provider:  Sanjiv Houston CRNA    Anesthesia Type:  MAC ASA Status:  2          Anesthesia Type: MAC  Last vitals  BP   126/62 (11/07/18 0920)   Temp   97.3 °F (36.3 °C) (11/07/18 0920)   Pulse   78 (11/07/18 0920)   Resp   18 (11/07/18 0920)     SpO2   100 % (11/07/18 0920)     Post Anesthesia Care and Evaluation    Patient location during evaluation: bedside  Patient participation: waiting for patient participation  Level of consciousness: responsive to physical stimuli  Pain score: 0  Pain management: adequate  Airway patency: patent  Anesthetic complications: No anesthetic complications  PONV Status: none  Cardiovascular status: acceptable  Respiratory status: acceptable  Hydration status: acceptable

## 2018-11-07 NOTE — ANESTHESIA PREPROCEDURE EVALUATION
Anesthesia Evaluation     Patient summary reviewed and Nursing notes reviewed   NPO Solid Status: > 8 hours  NPO Liquid Status: > 8 hours           Airway   Mallampati: II  TM distance: >3 FB  Neck ROM: full  Possible difficult intubation  Dental - normal exam     Pulmonary - normal exam   Cardiovascular - normal exam        Neuro/Psych  GI/Hepatic/Renal/Endo      Musculoskeletal     Abdominal    Substance History      OB/GYN          Other                        Anesthesia Plan    ASA 2     MAC     intravenous induction     Plan discussed with CRNA.

## 2018-11-12 ENCOUNTER — TELEPHONE (OUTPATIENT)
Dept: GASTROENTEROLOGY | Facility: CLINIC | Age: 33
End: 2018-11-12

## 2018-11-12 NOTE — TELEPHONE ENCOUNTER
11/12/2018, Zack Avila, Medical Assistant for the office of [facility] made aware patient telephoned per this staff member [phone] with notification Prior Authorization denial received via facsimile per [employer] stating prescription medication Hyoscyamine Sulfate 0.125 MG Tablets is not a benefit per patient's insurance plan.  Patient made aware this staff member will notify office of [facility] of this denial.  Patient verbalized understanding.

## 2019-01-11 ENCOUNTER — OFFICE VISIT (OUTPATIENT)
Dept: GASTROENTEROLOGY | Facility: CLINIC | Age: 34
End: 2019-01-11

## 2019-01-11 VITALS
OXYGEN SATURATION: 93 % | SYSTOLIC BLOOD PRESSURE: 110 MMHG | BODY MASS INDEX: 19.88 KG/M2 | HEIGHT: 63 IN | WEIGHT: 112.2 LBS | HEART RATE: 91 BPM | DIASTOLIC BLOOD PRESSURE: 82 MMHG

## 2019-01-11 DIAGNOSIS — K58.0 IRRITABLE BOWEL SYNDROME WITH DIARRHEA: Primary | ICD-10-CM

## 2019-01-11 PROCEDURE — 99214 OFFICE O/P EST MOD 30 MIN: CPT | Performed by: NURSE PRACTITIONER

## 2019-01-11 RX ORDER — DICYCLOMINE HCL 20 MG
20 TABLET ORAL
Qty: 90 TABLET | Refills: 5 | Status: SHIPPED | OUTPATIENT
Start: 2019-01-11 | End: 2019-02-07 | Stop reason: HOSPADM

## 2019-01-11 NOTE — PROGRESS NOTES
Chief Complaint   Patient presents with   • Irritable Bowel Syndrome     diarhhea       Subjective    Gisella Aldana is a 33 y.o. female. she is here today for follow-up.    History of Present Illness  33-year-old female presents for colonoscopy follow-up.  She was scheduled in November for follow-up however her father  November 10 and unable to come to any follow-ups.  States she had been doing well she had viral illness with nausea vomiting yesterday and her abdomen still feels mildly sore from that.  She was unable to try Levsin due to cost.  Denies any heartburn reflux or dysphagia.  Her weight is stable.  She has strong family history of colon cancer in her mother.  Colonoscopy was completed 18 and noted poor prep hemorrhoids otherwise normal exam.  Plan; trial of Bentyl for irritable bowel syndrome diarrhea predominant discontinue Levsin due to cost.  Repeat colonoscopy in 1 year due to poor prep and strong family history of colon cancer.  Recheck in 3 months or return to office sooner if needed.     The following portions of the patient's history were reviewed and updated as appropriate:   Past Medical History:   Diagnosis Date   • Encounter for  visit      visit status      • Surgical follow-up care    • Vomiting of pregnancy    • Vomiting of pregnancy, unspecified     Unspecified vomiting of pregnancy, antepartum condition or complication        Past Surgical History:   Procedure Laterality Date   •  SECTION  2015     Repeat low transverse  section.    • COLONOSCOPY N/A 2018    Procedure: COLONOSCOPY;  Surgeon: Kristofer Maldonado MD;  Location: [facility] ENDOSCOPY;  Service: Gastroenterology     Family History   Problem Relation Age of Onset   • Cancer Mother         colon cancer   • Cancer Father         Lung cancer   • Hypertension Paternal Grandmother    • Cancer Maternal Grandmother         lymphoma   • Heart disease Paternal Grandfather      OB  "History      Para Term  AB Living    2 2       2    SAB TAB Ectopic Molar Multiple Live Births                       Current Outpatient Medications   Medication Sig Dispense Refill   • dicyclomine (BENTYL) 20 MG tablet Take 1 tablet by mouth 4 (Four) Times a Day Before Meals & at Bedtime As Needed (cramping) for up to 30 days. 90 tablet 5     No current facility-administered medications for this visit.      No Known Allergies  Social History     Socioeconomic History   • Marital status:      Spouse name: Not on file   • Number of children: Not on file   • Years of education: Not on file   • Highest education level: Not on file   Tobacco Use   • Smoking status: Never Smoker   • Smokeless tobacco: Never Used   Substance and Sexual Activity   • Alcohol use: No   • Drug use: No   • Sexual activity: Yes     Birth control/protection: None       Review of Systems  Review of Systems   Constitutional: Negative for activity change, appetite change, chills, diaphoresis, fatigue, fever and unexpected weight change.   HENT: Negative for sore throat and trouble swallowing.    Respiratory: Negative for shortness of breath.    Gastrointestinal: Positive for abdominal pain. Negative for abdominal distention, anal bleeding, blood in stool, constipation, diarrhea, nausea, rectal pain and vomiting.   Musculoskeletal: Negative for arthralgias.   Skin: Negative for pallor.   Neurological: Negative for light-headedness.        /82 (BP Location: Left arm)   Pulse 91   Ht 160 cm (63\")   Wt 50.9 kg (112 lb 3.2 oz)   SpO2 93%   BMI 19.88 kg/m²     Objective    Physical Exam   Constitutional: She is oriented to person, place, and time. She appears well-developed and well-nourished. She is cooperative. No distress.   HENT:   Head: Normocephalic and atraumatic.   Neck: Normal range of motion. Neck supple. No thyromegaly present.   Cardiovascular: Normal rate, regular rhythm and normal heart sounds. "   Pulmonary/Chest: Effort normal and breath sounds normal. She has no wheezes. She has no rhonchi. She has no rales.   Abdominal: Soft. Normal appearance and bowel sounds are normal. She exhibits no distension. There is no hepatosplenomegaly. There is no tenderness. There is no rigidity and no guarding. No hernia.   Lymphadenopathy:     She has no cervical adenopathy.   Neurological: She is alert and oriented to person, place, and time.   Skin: Skin is warm, dry and intact. No rash noted. No pallor.   Psychiatric: She has a normal mood and affect. Her speech is normal.     Admission on 11/07/2018, Discharged on 11/07/2018   Component Date Value Ref Range Status   • HCG, Urine QL 11/07/2018 Negative  Negative Final     Assessment/Plan      1. Irritable bowel syndrome with diarrhea    .       Orders placed during this encounter include:  No orders of the defined types were placed in this encounter.      * Surgery not found *    Review and/or summary of lab tests, radiology, procedures, medications. Review and summary of old records and obtaining of history. The risks and benefits of my recommendations, as well as other treatment options were discussed with the patient today. Questions were answered.    New Medications Ordered This Visit   Medications   • dicyclomine (BENTYL) 20 MG tablet     Sig: Take 1 tablet by mouth 4 (Four) Times a Day Before Meals & at Bedtime As Needed (cramping) for up to 30 days.     Dispense:  90 tablet     Refill:  5       Follow-up: Return in about 3 months (around 4/11/2019).          This document has been electronically signed by CANDE Vogel on January 11, 2019 12:06 PM             Results for orders placed or performed during the hospital encounter of 11/07/18   Pregnancy, Urine - Urine, Clean Catch   Result Value Ref Range    HCG, Urine QL Negative Negative   Results for orders placed or performed in visit on 07/25/18   Liquid-based Pap Smear, Screening   Result Value Ref Range     Case Report       Gynecologic Cytology Report                       Case: [id number]                                  Authorizing Provider:  Submitter Client           Collected:           07/25/2018 08:40 AM          First Screen:          [place] Rockwellmarquis         Received:            07/27/2018 08:41 AM          Pathologist:           Herson Dunham MD                                                          Specimen:    Liquid-Based Pap, Screening, Cervix, Endocervix                                            Interpretation Negative for intraepithelial lesion or malignancy      Other Findings       Reactive cellular changes  Fungal organisms morphologically consistent with Candida spp  Mild inflammation    Specimen Adequacy       Satisfactory for evaluation, endocervical/transformation zone component present    LMP 07-     Additional Information       Disclaimer: Cervical cytology is a screening test primarily for squamous cancer and its precursors and has associated false-negative and false-positive results.  Technologies such as liquid-based preparations may decrease but will not eliminate all false-negative results.  Follow-up of unexplained clinical signs and symptoms is recommended to minimize false-negative results. (The [place] System for Reporting Cervical Cytology: Gonzalez, 2015).     Results for orders placed or performed during the hospital encounter of 05/11/15   Cannabinoid Confirmation, Ur   Result Value Ref Range    Please note Comment     THC, Urine Positive (A)     Carboxy THC, Urine 83 CUTOFF=10 ng/mL   Hemoglobin and hematocrit, blood   Result Value Ref Range    Hemoglobin 9.8 (L) 12.0 - 15.5 gm/dl    Hematocrit 27.8 (L) 35.0 - 45.0 %   Rapid drug screen, urine   Result Value Ref Range    Barbiturates Screen, Urine Negative NEGATIVE    Benzodiazepine Screen, Urine Negative NEGATIVE    Opiate Screen, Urine Negative NEGATIVE    THC Screen Interpretation POSITIVE (A) NEGATIVE     Amphet/Methamphet, Screen, Urine Negative NEGATIVE    Cocaine Screen, Urine Negative NEGATIVE    Oxycodone Screen, Urine Negative NEGATIVE    Methadone Screen, Urine Negative NEGATIVE   RPR   Result Value Ref Range    RPR Non Reactive Non Reactive   CBC and Differential   Result Value Ref Range    WBC 16.1 (H) 3.2 - 9.8 x1000/uL    RBC 4.17 3.77 - 5.16 amelia/mm3    Hemoglobin 11.9 (L) 12.0 - 15.5 gm/dl    Hematocrit 34.6 (L) 35.0 - 45.0 %    MCV 83.0 80.0 - 98.0 fl    MCH 28.5 26.0 - 34.0 pg    MCHC 34.4 31.4 - 36.0 gm/dl    RDW 14.0 11.5 - 14.5 %    Platelets 319 150 - 450 x1000/mm3    MPV 11.8 8.0 - 12.0 fl    Neutrophil Rel % 67.4 37.0 - 80.0 %    Lymphocyte Rel % 25.3 10.0 - 50.0 %    Monocyte Rel % 5.1 0.0 - 12.0 %    Eosinophil Rel % 1.3 0.0 - 7.0 %    Basophil Rel % 0.2 0.0 - 2.0 %    Immature Granulocyte Rel % 0.70 (H) 0.00 - 0.50 %    Neutrophils Absolute 10.87 (H) 2.00 - 8.60 x1000/uL    Lymphocytes Absolute 4.09 0.60 - 4.20 x1000/uL    Monocytes Absolute 0.82 0.00 - 0.90 x1000/uL    Eosinophils Absolute 0.21 0.00 - 0.70 x1000/uL    Basophils Absolute 0.03 0.00 - 0.20 x1000/uL    Immature Granulocytes Absolute 0.120 (H) 0.005 - 0.022 x1000/uL   Type and screen   Result Value Ref Range    DOES A PREVIOUS ABORH EXIST? PREVIOUS TYPE ON FILE     ABO Type B     RH type POS     Antibody Screen Interpretation NEG    Results for orders placed or performed in visit on 02/09/15   Glucose, Post Prandial 1 Hr   Result Value Ref Range    Glucose, 1Hr  60 - 140 mg/dl   CBC and Differential   Result Value Ref Range    WBC 15.9 (H) 3.2 - 9.8 x1000/uL    RBC 3.67 (L) 3.77 - 5.16 amelia/mm3    Hemoglobin 11.2 (L) 12.0 - 15.5 gm/dl    Hematocrit 32.8 (L) 35.0 - 45.0 %    MCV 89.4 80.0 - 98.0 fl    MCH 30.5 26.0 - 34.0 pg    MCHC 34.1 31.4 - 36.0 gm/dl    RDW 13.0 11.5 - 14.5 %    Platelets 305 150 - 450 x1000/mm3    MPV 11.5 8.0 - 12.0 fl    Neutrophil Rel % 75.4 37.0 - 80.0 %    Lymphocyte Rel % 17.7 10.0 - 50.0 %     Monocyte Rel % 4.7 0.0 - 12.0 %    Eosinophil Rel % 1.6 0.0 - 7.0 %    Basophil Rel % 0.1 0.0 - 2.0 %    Immature Granulocyte Rel % 0.50 0.00 - 0.50 %    Neutrophils Absolute 11.97 (H) 2.00 - 8.60 x1000/uL    Lymphocytes Absolute 2.81 0.60 - 4.20 x1000/uL    Monocytes Absolute 0.74 0.00 - 0.90 x1000/uL    Eosinophils Absolute 0.25 0.00 - 0.70 x1000/uL    Basophils Absolute 0.02 0.00 - 0.20 x1000/uL    Immature Granulocytes Absolute 0.080 (H) 0.005 - 0.022 x1000/uL   Results for orders placed or performed in visit on 10/10/14   Cannabinoid Confirmation, Ur   Result Value Ref Range    Please note Comment     THC, Urine Positive (A)     Carboxy THC, Urine >300 CUTOFF=10 ng/mL   Urinalysis, Microscopic only   Result Value Ref Range    WBC, UA 10-15 (A) 0-5,0,RARE,1-2,2-4,3-5  /HPF    RBC, UA 0-2 0-2,0,RARE  /HPF    Bacteria, UA TRACE 0,TRACE  /HPF    Mucus, UA 1+     Epithelial Cells, UA Rare Squamous  /HPF    Crystals, Joint Fluid 1+ Amorphous Phosphates (C)  /HPF   Hepatitis C antibody   Result Value Ref Range    Hep C Virus Ab Negative NEGATIVE   Rapid drug screen, urine   Result Value Ref Range    Barbiturates Screen, Urine Negative NEGATIVE    Benzodiazepine Screen, Urine Negative NEGATIVE    Opiate Screen, Urine Negative NEGATIVE    THC Screen Interpretation POSITIVE (A) NEGATIVE    Amphet/Methamphet, Screen, Urine Negative NEGATIVE    Cocaine Screen, Urine Negative NEGATIVE    Oxycodone Screen, Urine Negative NEGATIVE    Methadone Screen, Urine Negative NEGATIVE   Rubella antibody, IgG   Result Value Ref Range    Rubella IgG Quant Immune IMMUNE   RPR   Result Value Ref Range    RPR Non Reactive Non Reactive   HIV-1 and HIV-2 antibodies   Result Value Ref Range    HIV-1/ HIV-2 Non Reactive NON REACTIVE   Hepatitis B surface antigen   Result Value Ref Range    Hepatitis B Surface Ag Negative NEGATIVE   Urinalysis with microscopic   Result Value Ref Range    Color, UA CLAY     Appearance TURBID     Specific  Gravity, UA 1.022 1.003 - 1.030    pH, UA 7.5 pH Units    Leukocytes, UA 1+ (A) NEGATIVE    Nitrite, UA NEGATIVE NEGATIVE    Protein, UA 1+ (A) NEGATIVE    Glucose, Urine NEGATIVE NEGATIVE mg/dl    Ketones, UA TRACE (A) NEGATIVE    Urobilinogen, UA 0.2 0.2 EU/dl    Blood, UA NEGATIVE NEGATIVE     *Note: Due to a large number of results and/or encounters for the requested time period, some results have not been displayed. A complete set of results can be found in Results Review.

## 2019-01-11 NOTE — PATIENT INSTRUCTIONS
Diet for Irritable Bowel Syndrome  When you have irritable bowel syndrome (IBS), the foods you eat and your eating habits are very important. IBS may cause various symptoms, such as abdominal pain, constipation, or diarrhea. Choosing the right foods can help ease discomfort caused by these symptoms. Work with your health care provider and dietitian to find the best eating plan to help control your symptoms.  What general guidelines do I need to follow?  · Keep a food diary. This will help you identify foods that cause symptoms. Write down:  ? What you eat and when.  ? What symptoms you have.  ? When symptoms occur in relation to your meals.  · Avoid foods that cause symptoms. Talk with your dietitian about other ways to get the same nutrients that are in these foods.  · Eat more foods that contain fiber. Take a fiber supplement if directed by your dietitian.  · Eat your meals slowly, in a relaxed setting.  · Aim to eat 5-6 small meals per day. Do not skip meals.  · Drink enough fluids to keep your urine clear or pale yellow.  · Ask your health care provider if you should take an over-the-counter probiotic during flare-ups to help restore healthy gut bacteria.  · If you have cramping or diarrhea, try making your meals low in fat and high in carbohydrates. Examples of carbohydrates are pasta, rice, whole grain breads and cereals, fruits, and vegetables.  · If dairy products cause your symptoms to flare up, try eating less of them. You might be able to handle yogurt better than other dairy products because it contains bacteria that help with digestion.  What foods are not recommended?  The following are some foods and drinks that may worsen your symptoms:  · Fatty foods, such as French fries.  · Milk products, such as cheese or ice cream.  · Chocolate.  · Alcohol.  · Products with caffeine, such as coffee.  · Carbonated drinks, such as soda.    The items listed above may not be a complete list of foods and beverages to  avoid. Contact your dietitian for more information.  What foods are good sources of fiber?  Your health care provider or dietitian may recommend that you eat more foods that contain fiber. Fiber can help reduce constipation and other IBS symptoms. Add foods with fiber to your diet a little at a time so that your body can get used to them. Too much fiber at once might cause gas and swelling of your abdomen. The following are some foods that are good sources of fiber:  · Apples.  · Peaches.  · Pears.  · Berries.  · Figs.  · Broccoli (raw).  · Cabbage.  · Carrots.  · Raw peas.  · Kidney beans.  · Lima beans.  · Whole grain bread.  · Whole grain cereal.    Where to find more information:  International Foundation for Functional Gastrointestinal Disorders: www.iffgd.org  National Edon of Diabetes and Digestive and Kidney Diseases: www.niddk.nih.gov/health-information/health-topics/digestive-diseases/ibs/Pages/facts.aspx  This information is not intended to replace advice given to you by your health care provider. Make sure you discuss any questions you have with your health care provider.  Document Released: 03/09/2005 Document Revised: 05/25/2017 Document Reviewed: 03/20/2015  Elsevier Interactive Patient Education © 2018 Elsevier Inc.

## 2019-02-01 ENCOUNTER — OFFICE VISIT (OUTPATIENT)
Dept: FAMILY MEDICINE CLINIC | Facility: CLINIC | Age: 34
End: 2019-02-01

## 2019-02-01 VITALS
OXYGEN SATURATION: 98 % | SYSTOLIC BLOOD PRESSURE: 108 MMHG | DIASTOLIC BLOOD PRESSURE: 62 MMHG | HEIGHT: 63 IN | TEMPERATURE: 95.7 F | HEART RATE: 88 BPM | WEIGHT: 113 LBS | BODY MASS INDEX: 20.02 KG/M2

## 2019-02-01 DIAGNOSIS — H66.001 ACUTE SUPPURATIVE OTITIS MEDIA OF RIGHT EAR WITHOUT SPONTANEOUS RUPTURE OF TYMPANIC MEMBRANE, RECURRENCE NOT SPECIFIED: ICD-10-CM

## 2019-02-01 DIAGNOSIS — J01.10 ACUTE NON-RECURRENT FRONTAL SINUSITIS: Primary | ICD-10-CM

## 2019-02-01 PROCEDURE — 99213 OFFICE O/P EST LOW 20 MIN: CPT | Performed by: FAMILY MEDICINE

## 2019-02-01 RX ORDER — BENZONATATE 100 MG/1
100 CAPSULE ORAL 3 TIMES DAILY PRN
Qty: 30 CAPSULE | Refills: 1 | Status: SHIPPED | OUTPATIENT
Start: 2019-02-01 | End: 2019-11-19

## 2019-02-01 RX ORDER — AMOXICILLIN AND CLAVULANATE POTASSIUM 562.5; 437.5; 62.5 MG/1; MG/1; MG/1
2 TABLET, FILM COATED, EXTENDED RELEASE ORAL 2 TIMES DAILY
Qty: 20 TABLET | Refills: 0 | Status: SHIPPED | OUTPATIENT
Start: 2019-02-01 | End: 2019-02-01

## 2019-02-01 RX ORDER — AMOXICILLIN AND CLAVULANATE POTASSIUM 875; 125 MG/1; MG/1
1 TABLET, FILM COATED ORAL 2 TIMES DAILY
Qty: 20 TABLET | Refills: 0 | Status: SHIPPED | OUTPATIENT
Start: 2019-02-01 | End: 2019-02-07 | Stop reason: HOSPADM

## 2019-02-01 RX ORDER — ALBUTEROL SULFATE 90 UG/1
2 AEROSOL, METERED RESPIRATORY (INHALATION) EVERY 4 HOURS PRN
Qty: 1 INHALER | Refills: 12 | Status: SHIPPED | OUTPATIENT
Start: 2019-02-01 | End: 2022-09-15

## 2019-02-01 NOTE — PROGRESS NOTES
Subjective:     Gisella Aldana is a 34 y.o. female who presents for initial evaluation for cough and Sinus pain:    Cough and Sinus pain:   Patient has had a productive cough of yellow to green sputum for 4 days. She feels that her chest is tight. Last night she awoke with right ear pain. Pain was rated a 10/10. This felt to be popping. She used a heating pad. She had subjective fever 4 days ago. She denies any chills. She has a mild sore throat, which she attributes to cough. She is having a lot of congestion and green nasal discharge. She is having significant frontal sinus pressure. She has been taking Yolanda Taylor cold and cough.     Preventative:  Over the past 2 weeks, have you felt down, depressed, or hopeless?Yes   Over the past 2 weeks, have you felt little interest or pleasure in doing things?No  Clinical depression screening refused by patient.No     On osteoporosis therapy?Not Indicated     Past Medical Hx:  Past Medical History:   Diagnosis Date   • Encounter for  visit      visit status      • Surgical follow-up care    • Vomiting of pregnancy    • Vomiting of pregnancy, unspecified     Unspecified vomiting of pregnancy, antepartum condition or complication          Past Surgical Hx:  Past Surgical History:   Procedure Laterality Date   •  SECTION  2015     Repeat low transverse  section.    • COLONOSCOPY N/A 2018    Procedure: COLONOSCOPY;  Surgeon: Kristofer Maldonado MD;  Location: [facility] ENDOSCOPY;  Service: Gastroenterology       Health Maintenance:  Health Maintenance   Topic Date Due   • ANNUAL PHYSICAL  1987   • TDAP/TD VACCINES (1 - Tdap) 2003   • INFLUENZA VACCINE  2018   • COLONOSCOPY  2019   • PAP SMEAR  2021       Current Meds:    Current Outpatient Medications:   •  dicyclomine (BENTYL) 20 MG tablet, Take 1 tablet by mouth 4 (Four) Times a Day Before Meals & at Bedtime As Needed (cramping) for up to 30 days.,  Disp: 90 tablet, Rfl: 5  •  albuterol sulfate  (90 Base) MCG/ACT inhaler, Inhale 2 puffs Every 4 (Four) Hours As Needed for Wheezing., Disp: 1 inhaler, Rfl: 12  •  amoxicillin-clavulanate (AUGMENTIN) 875-125 MG per tablet, Take 1 tablet by mouth 2 (Two) Times a Day., Disp: 20 tablet, Rfl: 0  •  benzonatate (TESSALON PERLES) 100 MG capsule, Take 1 capsule by mouth 3 (Three) Times a Day As Needed for Cough., Disp: 30 capsule, Rfl: 1    Allergies:  Patient has no known allergies.    Family Hx:  Family History   Problem Relation Age of Onset   • Cancer Mother         colon cancer   • Cancer Father         Lung cancer   • Hypertension Paternal Grandmother    • Cancer Maternal Grandmother         lymphoma   • Heart disease Paternal Grandfather         Social History:  Social History     Socioeconomic History   • Marital status:      Spouse name: Not on file   • Number of children: Not on file   • Years of education: Not on file   • Highest education level: Not on file   Social Needs   • Financial resource strain: Not on file   • Food insecurity - worry: Not on file   • Food insecurity - inability: Not on file   • Transportation needs - medical: Not on file   • Transportation needs - non-medical: Not on file   Occupational History   • Not on file   Tobacco Use   • Smoking status: Never Smoker   • Smokeless tobacco: Never Used   Substance and Sexual Activity   • Alcohol use: No   • Drug use: No   • Sexual activity: Yes     Birth control/protection: None   Other Topics Concern   • Not on file   Social History Narrative   • Not on file       Review of Systems  Review of Systems   Constitutional: Positive for fever. Negative for chills.   HENT: Positive for congestion, ear pain, rhinorrhea and sore throat. Negative for hearing loss and trouble swallowing.    Eyes: Negative for pain and visual disturbance.   Respiratory: Positive for cough and shortness of breath.    Cardiovascular: Negative for chest pain and  "palpitations.   Gastrointestinal: Negative for abdominal pain, constipation, diarrhea, nausea and vomiting.   Genitourinary: Negative for dysuria, hematuria and urgency.   Musculoskeletal: Negative for back pain and neck pain.   Skin: Negative for rash and wound.   Neurological: Positive for headaches. Negative for dizziness, syncope and weakness.   Psychiatric/Behavioral: Negative for dysphoric mood. The patient is not nervous/anxious.        Objective:     /62   Pulse 88   Temp 95.7 °F (35.4 °C)   Ht 160 cm (63\")   Wt 51.3 kg (113 lb)   LMP 01/27/2019   SpO2 98%   Breastfeeding? No   BMI 20.02 kg/m²   Physical Exam   Constitutional: She is oriented to person, place, and time. She appears well-developed and well-nourished.   HENT:   Head: Normocephalic and atraumatic.       Right Ear: External ear normal.   Left Ear: External ear normal.   Ears:    Eyes: Conjunctivae and EOM are normal. Pupils are equal, round, and reactive to light. No scleral icterus.   Neck: Normal range of motion. Neck supple. No tracheal deviation present. No thyromegaly present.   Cardiovascular: Normal rate, regular rhythm and normal heart sounds. Exam reveals no gallop and no friction rub.   No murmur heard.  Pulmonary/Chest: Effort normal. No respiratory distress. She has no wheezes. She has no rales.   Abdominal: Soft. Bowel sounds are normal. She exhibits no distension. There is no tenderness.   Musculoskeletal: Normal range of motion. She exhibits no tenderness.   Lymphadenopathy:     She has no cervical adenopathy.   Neurological: She is alert and oriented to person, place, and time.   Skin: Skin is warm and dry. No rash noted.   Psychiatric: She has a normal mood and affect. Her behavior is normal. Judgment and thought content normal.   Vitals reviewed.    Assessment/Plan:     Gisella was seen today for nasal congestion and earache.    Diagnoses and all orders for this visit:    Acute non-recurrent frontal sinusitis: This " is an acute problem. I will treat her with Augmentin as below. I instructed her to take tylenol or Ibuprofen for fever control if needed.    -     amoxicillin-clavulanate (AUGMENTIN) 875-125 MG per tablet; Take 1 tablet by mouth 2 (Two) Times a Day..    Acute suppurative otitis media of right ear without spontaneous rupture of tympanic membrane, recurrence not specified: This is an acute problem. I will treat this with Augmentin as below.          -     amoxicillin-clavulanate (AUGMENTIN) 875-125 MG per tablet; Take 1 tablet by mouth 2 (Two) Times a Day..    Other orders  -     benzonatate (TESSALON PERLES) 100 MG capsule; Take 1 capsule by mouth 3 (Three) Times a Day As Needed for Cough.  -     albuterol sulfate  (90 Base) MCG/ACT inhaler; Inhale 2 puffs Every 4 (Four) Hours As Needed for Wheezing.      Follow-up:     Return if symptoms worsen or fail to improve.      Goals        Patient Stated    • Patient would liek to have improvment of her sinusitis.  (pt-stated)      Barriers: None            Preventative:    Vaccines Recommended at this visit:   Influenza and TDaP/TD    Vaccines Received at this visit:  Patient refused all vaccinations at this visit.    Screenings Recommended at this visit:  No Screenings offered today. Patient is up to date on all screenings at this time.     Screenings Ordered at this visit:  No screenings were offered today. Patient is up to date on all screenings.     Smoking Status:  Patient has never smoked.    Alcohol Intake:  Patient does not drink    Patient's Body mass index is 20.02 kg/m². BMI is within normal parameters. No follow-up required..         RISK SCORE: 2              This document has been electronically signed by Abdon Duarte MD on February 5, 2019 8:09 PM

## 2019-02-07 ENCOUNTER — APPOINTMENT (OUTPATIENT)
Dept: LAB | Facility: HOSPITAL | Age: 34
End: 2019-02-07

## 2019-02-07 ENCOUNTER — OFFICE VISIT (OUTPATIENT)
Dept: FAMILY MEDICINE CLINIC | Facility: CLINIC | Age: 34
End: 2019-02-07

## 2019-02-07 VITALS — BODY MASS INDEX: 20.02 KG/M2 | HEART RATE: 101 BPM | OXYGEN SATURATION: 99 % | WEIGHT: 113 LBS | HEIGHT: 63 IN

## 2019-02-07 DIAGNOSIS — R10.2 VULVAR PAIN: Primary | ICD-10-CM

## 2019-02-07 PROCEDURE — 99213 OFFICE O/P EST LOW 20 MIN: CPT | Performed by: FAMILY MEDICINE

## 2019-02-07 RX ORDER — FLUCONAZOLE 150 MG/1
150 TABLET ORAL ONCE
Qty: 1 TABLET | Refills: 0 | Status: SHIPPED | OUTPATIENT
Start: 2019-02-07 | End: 2019-02-07

## 2019-02-07 NOTE — PROGRESS NOTES
Subjective:     Gisella Aldana is a 34 y.o. female who presents for initial evaluation  for allergic reaction.side effect of antibiotics    Patient reports that she was started on antibiotics, Augmentin, for a severe bacterial sinus infection.  She took 3 or 4 days of the medication with side effect of diarrhea, and began to notice significant vulvar and vaginal pain.  She reports significant discomfort and tenderness on wiping, but denies any dysuria or frequency or urgency.  She reports this is happened previously when she took Augmentin when she was pregnant, with severe candidal infection.  She reports that she gets some relief from warm baths.  Denies any change in sexual partners or concern for exposure to an STI.        Past Medical Hx:  Past Medical History:   Diagnosis Date   • Encounter for  visit      visit status      • Surgical follow-up care    • Vomiting of pregnancy    • Vomiting of pregnancy, unspecified     Unspecified vomiting of pregnancy, antepartum condition or complication          Past Surgical Hx:  Past Surgical History:   Procedure Laterality Date   •  SECTION  2015     Repeat low transverse  section.    • COLONOSCOPY N/A 2018    Procedure: COLONOSCOPY;  Surgeon: Kristofer Maldonado MD;  Location: [facility] ENDOSCOPY;  Service: Gastroenterology       Health Maintenance:  Health Maintenance   Topic Date Due   • ANNUAL PHYSICAL  1987   • TDAP/TD VACCINES (1 - Tdap) 2003   • INFLUENZA VACCINE  2018   • COLONOSCOPY  2019   • PAP SMEAR  2021       Current Meds:    Current Outpatient Medications:   •  albuterol sulfate  (90 Base) MCG/ACT inhaler, Inhale 2 puffs Every 4 (Four) Hours As Needed for Wheezing., Disp: 1 inhaler, Rfl: 12  •  benzonatate (TESSALON PERLES) 100 MG capsule, Take 1 capsule by mouth 3 (Three) Times a Day As Needed for Cough., Disp: 30 capsule, Rfl: 1  •  fluconazole (DIFLUCAN) 150 MG tablet,  Take 1 tablet by mouth 1 (One) Time for 1 dose., Disp: 1 tablet, Rfl: 0    Allergies:  Amoxicillin    Family Hx:  Family History   Problem Relation Age of Onset   • Cancer Mother         colon cancer   • Cancer Father         Lung cancer   • Hypertension Paternal Grandmother    • Cancer Maternal Grandmother         lymphoma   • Heart disease Paternal Grandfather         Social History:  Social History     Socioeconomic History   • Marital status:      Spouse name: Not on file   • Number of children: Not on file   • Years of education: Not on file   • Highest education level: Not on file   Social Needs   • Financial resource strain: Not on file   • Food insecurity - worry: Not on file   • Food insecurity - inability: Not on file   • Transportation needs - medical: Not on file   • Transportation needs - non-medical: Not on file   Occupational History   • Not on file   Tobacco Use   • Smoking status: Never Smoker   • Smokeless tobacco: Never Used   Substance and Sexual Activity   • Alcohol use: No   • Drug use: No   • Sexual activity: Yes     Birth control/protection: None   Other Topics Concern   • Not on file   Social History Narrative   • Not on file       Review of Systems  Review of Systems   Constitutional: Negative for activity change, appetite change, fatigue and fever.   HENT: Positive for congestion, postnasal drip, rhinorrhea and sinus pressure. Negative for ear pain and sore throat.    Eyes: Negative for pain and visual disturbance.   Respiratory: Negative for cough and shortness of breath.    Cardiovascular: Negative for chest pain and palpitations.   Gastrointestinal: Negative for abdominal pain and nausea.   Endocrine: Negative for cold intolerance and heat intolerance.   Genitourinary: Positive for vaginal pain. Negative for difficulty urinating and dysuria.   Musculoskeletal: Negative for arthralgias and gait problem.   Skin: Negative for color change and rash.   Neurological: Negative for  "dizziness, weakness and headaches.   Hematological: Negative for adenopathy. Does not bruise/bleed easily.   Psychiatric/Behavioral: Negative for agitation, confusion and sleep disturbance.       Objective:     Pulse 101   Ht 160 cm (63\")   Wt 51.3 kg (113 lb)   LMP 01/27/2019   SpO2 99%   BMI 20.02 kg/m²     Physical Exam   Constitutional: She is oriented to person, place, and time. She appears well-developed and well-nourished. No distress.   HENT:   Head: Normocephalic and atraumatic.   Eyes: Conjunctivae, EOM and lids are normal. Pupils are equal, round, and reactive to light.   Neck: Normal range of motion. Neck supple.   Abdominal: Normal appearance. There is no tenderness.   Genitourinary: Rectum normal. No labial fusion. There is tenderness on the right labia. There is no rash, lesion or injury on the right labia. There is tenderness on the left labia. There is no rash, lesion or injury on the left labia. There is tenderness in the vagina.   Genitourinary Comments: Scant amount of creamy discharge.   Musculoskeletal: Normal range of motion.   Neurological: She is alert and oriented to person, place, and time.   Skin: Skin is warm, dry and intact. Capillary refill takes less than 2 seconds. She is not diaphoretic.   Psychiatric: She has a normal mood and affect. Her speech is normal and behavior is normal. Judgment and thought content normal. Cognition and memory are normal.       Assessment/Plan:     Gisella was seen today for allergic reaction.    Diagnoses and all orders for this visit:    Vulvar pain  -     fluconazole (DIFLUCAN) 150 MG tablet; Take 1 tablet by mouth 1 (One) Time for 1 dose.  -     Bacterial Vaginosis, ZAK - Swab, Vagina       Discussed with patient that discharge was not clear in etiology of a bacterial vaginosis or candidal infection.  As patient has had vaginal candidiasis as a consequence of antibiotics in the past will write her for Diflucan.  We will call her with results of " vaginosis panel depending on findings.  If positive for bacterial vaginosis will write patient for Flagyl.  Discussed with patient about discontinuation of her antibiotics and as she is currently improving we will not recommend continuation of a new prescription.  Should her symptoms begin to worsen she should call and I will write her for a different antibiotic for continued treatment of bacterial sinusitis; suspect possible viral etiology as patient continues to improve.      Follow-up:     No Follow-up on file.      Goals        Patient Stated    • Patient would liek to have improvment of her sinusitis.  (pt-stated)      Barriers: None            Preventative:    Vaccines Recommended at this visit:   Influenza    Screenings Recommended at this visit:  No Screenings offered today. Patient is up to date on all screenings at this time.     Smoking Status:  Patient has never smoked.    Alcohol Intake:  Patient does not drink    Patient's Body mass index is 20.02 kg/m². BMI is within normal parameters. No follow-up required..        RISK SCORE: 2      Signature:  Nellie Luis MD [facility] PGY3  [place] [address]  Office: [phone]          This document has been electronically signed by Nellie Luis MD on February 7, 2019 3:12 PM

## 2019-02-08 NOTE — PROGRESS NOTES
I have seen the patient.  I have reviewed the notes, assessments, and/or procedures performed by Dr. Luis, I concur with her/his documentation and assessment and plan for Gisella Aldana.       This document has been electronically signed by Grant Canela MD on February 8, 2019 2:49 PM

## 2019-02-08 NOTE — PROGRESS NOTES
I have seen the patient.  I have reviewed the notes, assessments, and/or procedures performed by Dr. Duarte, I concur with her/his documentation and assessment and plan for Gisella Aldana.       This document has been electronically signed by Grant Canela MD on February 8, 2019 2:45 PM

## 2019-07-15 ENCOUNTER — APPOINTMENT (OUTPATIENT)
Dept: LAB | Facility: HOSPITAL | Age: 34
End: 2019-07-15

## 2019-07-15 ENCOUNTER — OFFICE VISIT (OUTPATIENT)
Dept: FAMILY MEDICINE CLINIC | Facility: CLINIC | Age: 34
End: 2019-07-15

## 2019-07-15 VITALS
HEIGHT: 63 IN | HEART RATE: 81 BPM | DIASTOLIC BLOOD PRESSURE: 80 MMHG | BODY MASS INDEX: 19.31 KG/M2 | OXYGEN SATURATION: 99 % | SYSTOLIC BLOOD PRESSURE: 110 MMHG | TEMPERATURE: 97.4 F | WEIGHT: 109 LBS

## 2019-07-15 DIAGNOSIS — J06.9 UPPER RESPIRATORY INFECTION, VIRAL: ICD-10-CM

## 2019-07-15 DIAGNOSIS — R05.9 COUGH: Primary | ICD-10-CM

## 2019-07-15 LAB
ALBUMIN SERPL-MCNC: 4.6 G/DL (ref 3.5–5.2)
ALBUMIN/GLOB SERPL: 1.4 G/DL
ALP SERPL-CCNC: 102 U/L (ref 39–117)
ALT SERPL W P-5'-P-CCNC: 13 U/L (ref 1–33)
ANION GAP SERPL CALCULATED.3IONS-SCNC: 12 MMOL/L (ref 5–15)
AST SERPL-CCNC: 15 U/L (ref 1–32)
BASOPHILS # BLD AUTO: 0.04 10*3/MM3 (ref 0–0.2)
BASOPHILS NFR BLD AUTO: 0.4 % (ref 0–1.5)
BILIRUB SERPL-MCNC: 0.4 MG/DL (ref 0.2–1.2)
BUN BLD-MCNC: 5 MG/DL (ref 6–20)
BUN/CREAT SERPL: 7.9 (ref 7–25)
CALCIUM SPEC-SCNC: 9.4 MG/DL (ref 8.6–10.5)
CHLORIDE SERPL-SCNC: 103 MMOL/L (ref 98–107)
CO2 SERPL-SCNC: 26 MMOL/L (ref 22–29)
CREAT BLD-MCNC: 0.63 MG/DL (ref 0.57–1)
DEPRECATED RDW RBC AUTO: 45.4 FL (ref 37–54)
EOSINOPHIL # BLD AUTO: 0.4 10*3/MM3 (ref 0–0.4)
EOSINOPHIL NFR BLD AUTO: 3.6 % (ref 0.3–6.2)
ERYTHROCYTE [DISTWIDTH] IN BLOOD BY AUTOMATED COUNT: 13.3 % (ref 12.3–15.4)
GFR SERPL CREATININE-BSD FRML MDRD: 108 ML/MIN/1.73
GLOBULIN UR ELPH-MCNC: 3.3 GM/DL
GLUCOSE BLD-MCNC: 101 MG/DL (ref 65–99)
HCT VFR BLD AUTO: 41.7 % (ref 34–46.6)
HGB BLD-MCNC: 14.3 G/DL (ref 12–15.9)
IMM GRANULOCYTES # BLD AUTO: 0.02 10*3/MM3 (ref 0–0.05)
IMM GRANULOCYTES NFR BLD AUTO: 0.2 % (ref 0–0.5)
LYMPHOCYTES # BLD AUTO: 4.69 10*3/MM3 (ref 0.7–3.1)
LYMPHOCYTES NFR BLD AUTO: 42.2 % (ref 19.6–45.3)
MCH RBC QN AUTO: 32.1 PG (ref 26.6–33)
MCHC RBC AUTO-ENTMCNC: 34.3 G/DL (ref 31.5–35.7)
MCV RBC AUTO: 93.7 FL (ref 79–97)
MONOCYTES # BLD AUTO: 0.55 10*3/MM3 (ref 0.1–0.9)
MONOCYTES NFR BLD AUTO: 5 % (ref 5–12)
NEUTROPHILS # BLD AUTO: 5.41 10*3/MM3 (ref 1.7–7)
NEUTROPHILS NFR BLD AUTO: 48.6 % (ref 42.7–76)
NRBC BLD AUTO-RTO: 0 /100 WBC (ref 0–0.2)
PLATELET # BLD AUTO: 395 10*3/MM3 (ref 140–450)
PMV BLD AUTO: 11 FL (ref 6–12)
POTASSIUM BLD-SCNC: 3.5 MMOL/L (ref 3.5–5.2)
PROT SERPL-MCNC: 7.9 G/DL (ref 6–8.5)
RBC # BLD AUTO: 4.45 10*6/MM3 (ref 3.77–5.28)
SODIUM BLD-SCNC: 141 MMOL/L (ref 136–145)
WBC NRBC COR # BLD: 11.11 10*3/MM3 (ref 3.4–10.8)

## 2019-07-15 PROCEDURE — 85025 COMPLETE CBC W/AUTO DIFF WBC: CPT | Performed by: STUDENT IN AN ORGANIZED HEALTH CARE EDUCATION/TRAINING PROGRAM

## 2019-07-15 PROCEDURE — 80053 COMPREHEN METABOLIC PANEL: CPT | Performed by: STUDENT IN AN ORGANIZED HEALTH CARE EDUCATION/TRAINING PROGRAM

## 2019-07-15 PROCEDURE — 36415 COLL VENOUS BLD VENIPUNCTURE: CPT | Performed by: STUDENT IN AN ORGANIZED HEALTH CARE EDUCATION/TRAINING PROGRAM

## 2019-07-15 PROCEDURE — 99213 OFFICE O/P EST LOW 20 MIN: CPT | Performed by: STUDENT IN AN ORGANIZED HEALTH CARE EDUCATION/TRAINING PROGRAM

## 2019-07-16 ENCOUNTER — TELEPHONE (OUTPATIENT)
Dept: FAMILY MEDICINE CLINIC | Facility: CLINIC | Age: 34
End: 2019-07-16

## 2019-07-16 NOTE — TELEPHONE ENCOUNTER
Spoke with pt to let her know that her xray didn't show any pneumonia (Dr Berkowitz looked at impression as well) but pt wanted me to let Dr Corbett know that she is still coughing and wanted something for this, I told her I would let Chelle know and call her back

## 2019-07-16 NOTE — PROGRESS NOTES
ID: Gisella Aldana    CC:   Chief Complaint   Patient presents with   • possible pneumonia     son has pneumonia onset 5-6 days       Subjective:     HPI     Gisella Aldana is a 34 y.o. female who presents for pneumonia.  Patient's son was diagnosed at the pediatrician's office upstairs earlier today for pneumonia.  She states she has the same symptoms as her son.  She has not had any fevers.  She has been coughing for the past 5 days with some yellowish/green sputum.  She has not noticed any wheezing, diarrhea, headache, constipation, shortness of breath.  Patient has no other symptoms.  Patient is not complaining of any sneezing, ear pain. Patient states it is not getting better or worse, its staying the same.     Preventative:  Over the past 2 weeks, have you felt down, depressed, or hopeless? non  Over the past 2 weeks, have you felt little interest or pleasure in doing things? no  Clinical depression screening refused by patient no    On osteoporosis therapy? no    Past Medical Hx:  Past Medical History:   Diagnosis Date   • Encounter for  visit      visit status      • Surgical follow-up care    • Vomiting of pregnancy    • Vomiting of pregnancy, unspecified     Unspecified vomiting of pregnancy, antepartum condition or complication          Past Surgical Hx:  Past Surgical History:   Procedure Laterality Date   •  SECTION  2015     Repeat low transverse  section.    • COLONOSCOPY N/A 2018    Procedure: COLONOSCOPY;  Surgeon: Kristofer Maldonado MD;  Location: [facility] ENDOSCOPY;  Service: Gastroenterology       Health Maintenance:  Health Maintenance   Topic Date Due   • ANNUAL PHYSICAL  1987   • TDAP/TD VACCINES (1 - Tdap) 2003   • INFLUENZA VACCINE  2019   • COLONOSCOPY  2019   • PAP SMEAR  2021       Current Meds:    Current Outpatient Medications:   •  albuterol sulfate  (90 Base) MCG/ACT inhaler, Inhale 2 puffs  Every 4 (Four) Hours As Needed for Wheezing., Disp: 1 inhaler, Rfl: 12  •  benzonatate (TESSALON PERLES) 100 MG capsule, Take 1 capsule by mouth 3 (Three) Times a Day As Needed for Cough., Disp: 30 capsule, Rfl: 1    Allergies:  Amoxicillin    Family Hx:  Family History   Problem Relation Age of Onset   • Cancer Mother         colon cancer   • Cancer Father         Lung cancer   • Hypertension Paternal Grandmother    • Cancer Maternal Grandmother         lymphoma   • Heart disease Paternal Grandfather         Social History:  Social History     Socioeconomic History   • Marital status:      Spouse name: Not on file   • Number of children: Not on file   • Years of education: Not on file   • Highest education level: Not on file   Tobacco Use   • Smoking status: Never Smoker   • Smokeless tobacco: Never Used   Substance and Sexual Activity   • Alcohol use: No   • Drug use: No   • Sexual activity: Yes     Birth control/protection: None       Review of Systems   Constitutional: Negative for activity change, appetite change, chills, fatigue and fever.   HENT: Negative for drooling, ear discharge, ear pain, facial swelling, hearing loss, mouth sores, rhinorrhea and sinus pain.    Eyes: Negative for pain, redness and itching.   Respiratory: Positive for cough. Negative for choking, chest tightness, shortness of breath and stridor.    Cardiovascular: Negative for chest pain, palpitations and leg swelling.   Gastrointestinal: Negative for abdominal distention, abdominal pain, anal bleeding, blood in stool, constipation, diarrhea and nausea.   Endocrine: Negative for heat intolerance, polydipsia and polyphagia.   Genitourinary: Negative for dysuria, flank pain, frequency and genital sores.   Musculoskeletal: Negative for back pain, gait problem, joint swelling and myalgias.   Skin: Negative for pallor and rash.   Neurological: Negative for seizures, facial asymmetry, speech difficulty, light-headedness, numbness and  "headaches.   Hematological: Negative for adenopathy. Does not bruise/bleed easily.   Psychiatric/Behavioral: Negative for confusion, decreased concentration, dysphoric mood and hallucinations. The patient is not nervous/anxious and is not hyperactive.          Objective:     /80 (BP Location: Right arm)   Pulse 81   Temp 97.4 °F (36.3 °C)   Ht 160 cm (63\")   Wt 49.4 kg (109 lb)   SpO2 99%   Breastfeeding? No   BMI 19.31 kg/m²     Physical Exam   Constitutional: She is oriented to person, place, and time. She appears well-developed and well-nourished.   HENT:   Head: Normocephalic and atraumatic.   Right Ear: External ear normal.   Left Ear: External ear normal.   Nose: Nose normal.   Mouth/Throat: Oropharynx is clear and moist.   Eyes: Conjunctivae and EOM are normal. Pupils are equal, round, and reactive to light.   Neck: Normal range of motion. Neck supple.   Cardiovascular: Normal rate, regular rhythm, normal heart sounds and intact distal pulses.   Pulmonary/Chest: Effort normal and breath sounds normal. No stridor. No respiratory distress. She has no wheezes. She exhibits no tenderness.   Abdominal: Soft. Bowel sounds are normal.   Musculoskeletal: Normal range of motion.   Neurological: She is alert and oriented to person, place, and time.   Skin: Skin is warm and dry.   Psychiatric: She has a normal mood and affect. Her behavior is normal. Judgment and thought content normal.              Assessment/Plan:     Gisella was seen today for possible pneumonia.    Diagnoses and all orders for this visit:    Cough  -     XR Chest PA & Lateral; Future  -     Cancel: CBC & Differential  -     Cancel: Comprehensive Metabolic Panel  -     CBC & Differential  -     Comprehensive Metabolic Panel  -     CBC Auto Differential    Upper respiratory Infection: Most likely viral, has not had any fevers. Physical exam was not significant.    - CXR was negative for any acute disease   - CBC showed slight increase in " WBC with no change in neutrophils   - Will continue to monitor and suggest using albuterol and mucinex as needed   - Called patient to tell results, did not answer. Will try again today.       Follow-up:     No follow up required, unless symptoms get worse.     Goals:   Goals     • Patient would liek to have improvment of her sinusitis.  (pt-stated)      Barriers: None          Barriers to goals: none     Health Maintenance   Topic Date Due   • ANNUAL PHYSICAL  01/16/1987   • TDAP/TD VACCINES (1 - Tdap) 01/16/2003   • INFLUENZA VACCINE  08/01/2019   • COLONOSCOPY  11/07/2019   • PAP SMEAR  07/25/2021       Tobacco: nonsmoker  Alcohol: does not drink  Lifestyle: Patient's Body mass index is 19.31 kg/m². BMI is within normal parameters. No follow-up required..   reduce screen time, reduce portion size, reduce fast food intake, family to eat at dinner table more often, keep TV off during meals, plan meals and eat breakfast    RISK SCORE: 2         Crystal Corbett M.D. PGY1  [facility]  [address] [place] [address]  Office: [phone]    This document has been electronically signed by Crystal Corbett MD on July 16, 2019 8:53 AM            This document has been electronically signed by Crystal Corbett MD on July 16, 2019 8:49 AM

## 2019-07-16 NOTE — PROGRESS NOTES
I have seen the patient.  I have reviewed the notes, assessments, and/or procedures performed by Dr. Crystal Corbett, I concur with her  documentation and assessment and plan for Gisella Aldana.    Lab work and chest x-ray reviewed today.        This document has been electronically signed by Santo Felton MD on July 16, 2019 10:01 AM

## 2019-07-16 NOTE — TELEPHONE ENCOUNTER
Pt called asking for the results of her x ray. Pt said the Dr called her yesterday afternoon but she didn't talk to her. She is asking if she needs to take any medication. She is hurting.    Thanks  Viviana

## 2019-11-19 ENCOUNTER — OFFICE VISIT (OUTPATIENT)
Dept: GASTROENTEROLOGY | Facility: CLINIC | Age: 34
End: 2019-11-19

## 2019-11-19 VITALS
HEIGHT: 63 IN | DIASTOLIC BLOOD PRESSURE: 68 MMHG | BODY MASS INDEX: 19.63 KG/M2 | SYSTOLIC BLOOD PRESSURE: 106 MMHG | WEIGHT: 110.8 LBS | HEART RATE: 80 BPM

## 2019-11-19 DIAGNOSIS — Z12.11 SPECIAL SCREENING FOR MALIGNANT NEOPLASM OF COLON: Primary | ICD-10-CM

## 2019-11-19 DIAGNOSIS — Z80.0 FH: COLON CANCER: ICD-10-CM

## 2019-11-19 PROCEDURE — S0285 CNSLT BEFORE SCREEN COLONOSC: HCPCS | Performed by: NURSE PRACTITIONER

## 2019-11-19 RX ORDER — SODIUM CHLORIDE 0.9 % (FLUSH) 0.9 %
3 SYRINGE (ML) INJECTION EVERY 12 HOURS SCHEDULED
Status: CANCELLED | OUTPATIENT
Start: 2019-11-19

## 2019-11-19 RX ORDER — SODIUM CHLORIDE 0.9 % (FLUSH) 0.9 %
10 SYRINGE (ML) INJECTION AS NEEDED
Status: CANCELLED | OUTPATIENT
Start: 2019-11-19

## 2019-11-19 RX ORDER — DEXTROSE AND SODIUM CHLORIDE 5; .45 G/100ML; G/100ML
30 INJECTION, SOLUTION INTRAVENOUS CONTINUOUS PRN
Status: CANCELLED | OUTPATIENT
Start: 2019-11-25

## 2019-11-19 NOTE — PATIENT INSTRUCTIONS
Colorectal Cancer Screening    Colorectal cancer screening is a group of tests that are used to check for colorectal cancer before symptoms develop. Colorectal refers to the colon and rectum. The colon and rectum are located at the end of the digestive tract and carry bowel movements out of the body.  Who should have screening?  All adults starting at age 50 until age 75 should have screening. Your health care provider may recommend screening at age 45. You will have tests every 1-10 years, depending on your results and the type of screening test.  You may have screening tests starting at an earlier age, or more frequently than other people, if you have any of the following risk factors:  · A personal or family history of colorectal cancer or abnormal growths (polyps).  · Inflammatory bowel disease, such as ulcerative colitis or Crohn's disease.  · A history of having radiation treatment to the abdomen or pelvic area for cancer.  · Colorectal cancer symptoms, such as changes in bowel habits or blood in your stool.  · A type of colon cancer syndrome that is passed from parent to child (hereditary), such as:  ? Low syndrome.  ? Familial adenomatous polyposis.  ? Turcot syndrome.  ? Peutz-Jeghers syndrome.  Screening recommendations for adults who are 75-85 years old vary depending on health.  How is screening done?  There are several types of colorectal screening tests. You may have one or more of the following:  · Guaiac-based fecal occult blood testing. For this test, a stool (feces) sample is checked for hidden (occult) blood, which could be a sign of colorectal cancer.  · Fecal immunochemical test (FIT). For this test, a stool sample is checked for blood, which could be a sign of colorectal cancer.  · Stool DNA test. For this test, a stool sample is checked for blood and changes in DNA that could lead to colorectal cancer.  · Sigmoidoscopy. During this test, a thin, flexible tube with a camera on the end  (sigmoidoscope) is used to examine the rectum and the lower colon.  · Colonoscopy. During this test, a long, flexible tube with a camera on the end (colonoscope) is used to examine the entire colon and rectum. With a colonoscopy, it is possible to take a sample of tissue (biopsy) and remove small polyps during the test.  · Virtual colonoscopy. Instead of a colonoscope, this type of colonoscopy uses X-rays (CT scan) and computers to produce images of the colon and rectum.  What are the benefits of screening?  Screening reduces your risk for colorectal cancer and can help identify cancer at an early stage, when the cancer can be removed or treated more easily. It is common for polyps to form in the lining of the colon, especially as you age. These polyps may be cancerous or become cancerous over time. Screening can identify these polyps.  What are the risks of screening?  Each screening test may have different risks.  · Stool sample tests have fewer risks than other types of screening tests. However, you may need more tests to confirm results from a stool sample test.  · Screening tests that involve X-rays expose you to low levels of radiation, which may slightly increase your cancer risk. The benefit of detecting cancer outweighs the slight increase in risk.  · Screening tests such as sigmoidoscopy and colonoscopy may place you at risk for bleeding, intestinal damage, infection, or a reaction to medicines given during the exam.  Talk with your health care provider to understand your risk for colorectal cancer and to make a screening plan that is right for you.  Questions to ask your health care provider  · When should I start colorectal cancer screening?  · What is my risk for colorectal cancer?  · How often do I need screening?  · Which screening tests do I need?  · How do I get my test results?  · What do my results mean?  Where to find more information  Learn more about colorectal cancer screening from:  · The  American Cancer Society: www.cancer.org  · The National Cancer [place]: www.cancer.gov  Summary  · Colorectal cancer screening is a group of tests used to check for colorectal cancer before symptoms develop.  · Screening reduces your risk for colorectal cancer and can help identify cancer at an early stage, when the cancer can be removed or treated more easily.  · All adults starting at age 50 until age 75 should have screening. Your health care provider may recommend screening at age 45.  · You may have screening tests starting at an earlier age, or more frequently than other people, if you have certain risk factors.  · Talk with your health care provider to understand your risk for colorectal cancer and to make a screening plan that is right for you.  This information is not intended to replace advice given to you by your health care provider. Make sure you discuss any questions you have with your health care provider.  Document Released: 06/07/2011 Document Revised: 11/30/2018 Document Reviewed: 09/19/2018  [employer] Patient Education © 2019 [employer]

## 2019-11-19 NOTE — PROGRESS NOTES
Chief Complaint   Patient presents with   • Colon Cancer Screening       Subjective    Gisella Aldana is a 34 y.o. female. she is here today for follow-up.    History of Present Illness  34-year-old female presents to discuss screening colonoscopy due to strong family history of colon cancer mother was diagnosed colon cancer at age 35 and poor prep on last colonoscopy 2018.  Denies any abdominal pain, nausea, vomiting or change within her bowel habits.  Denies any melena or hematochezia.  Denies any nocturnal symptoms  Plan; schedule patient for colonoscopy due to strong family history of colon cancer in her mother a poor prep on previous colonoscopy.     The following portions of the patient's history were reviewed and updated as appropriate:   Past Medical History:   Diagnosis Date   • Encounter for  visit      visit status      • Surgical follow-up care    • Vomiting of pregnancy    • Vomiting of pregnancy, unspecified     Unspecified vomiting of pregnancy, antepartum condition or complication        Past Surgical History:   Procedure Laterality Date   •  SECTION  2015     Repeat low transverse  section.    • COLONOSCOPY N/A 2018    Procedure: COLONOSCOPY;  Surgeon: Kristofer Maldonado MD;  Location: [facility] ENDOSCOPY;  Service: Gastroenterology     Family History   Problem Relation Age of Onset   • Cancer Mother         colon cancer   • Cancer Father         Lung cancer   • Hypertension Paternal Grandmother    • Cancer Maternal Grandmother         lymphoma   • Heart disease Paternal Grandfather      OB History      Para Term  AB Living    2 2       2    SAB TAB Ectopic Molar Multiple Live Births                       Prior to Admission medications    Medication Sig Start Date End Date Taking? Authorizing Provider   albuterol sulfate  (90 Base) MCG/ACT inhaler Inhale 2 puffs Every 4 (Four) Hours As Needed for Wheezing. 19  Yes Gerald  "Abdon Sands MD   benzonatate (TESSALON PERLES) 100 MG capsule Take 1 capsule by mouth 3 (Three) Times a Day As Needed for Cough. 2/1/19 11/19/19  Abdon Duarte MD     Allergies   Allergen Reactions   • Amoxicillin Rash     Social History     Socioeconomic History   • Marital status:      Spouse name: Not on file   • Number of children: Not on file   • Years of education: Not on file   • Highest education level: Not on file   Tobacco Use   • Smoking status: Never Smoker   • Smokeless tobacco: Never Used   Substance and Sexual Activity   • Alcohol use: No   • Drug use: No   • Sexual activity: Yes     Birth control/protection: None       Review of Systems  Review of Systems   Constitutional: Negative for activity change, appetite change, chills, diaphoresis, fatigue, fever and unexpected weight change.   HENT: Negative for sore throat and trouble swallowing.    Respiratory: Negative for shortness of breath.    Gastrointestinal: Negative for abdominal distention, abdominal pain, anal bleeding, blood in stool, constipation, diarrhea, nausea, rectal pain and vomiting.   Musculoskeletal: Negative for arthralgias.   Skin: Negative for pallor.   Neurological: Negative for light-headedness.        /68 (BP Location: Left arm)   Pulse 80   Ht 160 cm (63\")   Wt 50.3 kg (110 lb 12.8 oz)   BMI 19.63 kg/m²     Objective    Physical Exam   Constitutional: She is oriented to person, place, and time. She appears well-developed and well-nourished. She is cooperative. No distress.   HENT:   Head: Normocephalic and atraumatic.   Neck: Normal range of motion. Neck supple. No thyromegaly present.   Cardiovascular: Normal rate, regular rhythm and normal heart sounds.   Pulmonary/Chest: Effort normal and breath sounds normal. She has no wheezes. She has no rhonchi. She has no rales.   Abdominal: Soft. Normal appearance and bowel sounds are normal. She exhibits no distension. There is no hepatosplenomegaly. There " is no tenderness. There is no rigidity and no guarding. No hernia.   Lymphadenopathy:     She has no cervical adenopathy.   Neurological: She is alert and oriented to person, place, and time.   Skin: Skin is warm, dry and intact. No rash noted. No pallor.   Psychiatric: She has a normal mood and affect. Her speech is normal.     Office Visit on 07/15/2019   Component Date Value Ref Range Status   • Glucose 07/15/2019 101* 65 - 99 mg/dL Final   • BUN 07/15/2019 5* 6 - 20 mg/dL Final   • Creatinine 07/15/2019 0.63  0.57 - 1.00 mg/dL Final   • Sodium 07/15/2019 141  136 - 145 mmol/L Final   • Potassium 07/15/2019 3.5  3.5 - 5.2 mmol/L Final   • Chloride 07/15/2019 103  98 - 107 mmol/L Final   • CO2 07/15/2019 26.0  22.0 - 29.0 mmol/L Final   • Calcium 07/15/2019 9.4  8.6 - 10.5 mg/dL Final   • Total Protein 07/15/2019 7.9  6.0 - 8.5 g/dL Final   • Albumin 07/15/2019 4.60  3.50 - 5.20 g/dL Final   • ALT (SGPT) 07/15/2019 13  1 - 33 U/L Final   • AST (SGOT) 07/15/2019 15  1 - 32 U/L Final   • Alkaline Phosphatase 07/15/2019 102  39 - 117 U/L Final   • Total Bilirubin 07/15/2019 0.4  0.2 - 1.2 mg/dL Final   • eGFR Non African Amer 07/15/2019 108  >60 mL/min/1.73 Final   • Globulin 07/15/2019 3.3  gm/dL Final   • A/G Ratio 07/15/2019 1.4  g/dL Final   • BUN/Creatinine Ratio 07/15/2019 7.9  7.0 - 25.0 Final   • Anion Gap 07/15/2019 12.0  5.0 - 15.0 mmol/L Final   • WBC 07/15/2019 11.11* 3.40 - 10.80 10*3/mm3 Final   • RBC 07/15/2019 4.45  3.77 - 5.28 10*6/mm3 Final   • Hemoglobin 07/15/2019 14.3  12.0 - 15.9 g/dL Final   • Hematocrit 07/15/2019 41.7  34.0 - 46.6 % Final   • MCV 07/15/2019 93.7  79.0 - 97.0 fL Final   • MCH 07/15/2019 32.1  26.6 - 33.0 pg Final   • MCHC 07/15/2019 34.3  31.5 - 35.7 g/dL Final   • RDW 07/15/2019 13.3  12.3 - 15.4 % Final   • RDW-SD 07/15/2019 45.4  37.0 - 54.0 fl Final   • MPV 07/15/2019 11.0  6.0 - 12.0 fL Final   • Platelets 07/15/2019 395  140 - 450 10*3/mm3 Final   • Neutrophil %  07/15/2019 48.6  42.7 - 76.0 % Final   • Lymphocyte % 07/15/2019 42.2  19.6 - 45.3 % Final   • Monocyte % 07/15/2019 5.0  5.0 - 12.0 % Final   • Eosinophil % 07/15/2019 3.6  0.3 - 6.2 % Final   • Basophil % 07/15/2019 0.4  0.0 - 1.5 % Final   • Immature Grans % 07/15/2019 0.2  0.0 - 0.5 % Final   • Neutrophils, Absolute 07/15/2019 5.41  1.70 - 7.00 10*3/mm3 Final   • Lymphocytes, Absolute 07/15/2019 4.69* 0.70 - 3.10 10*3/mm3 Final   • Monocytes, Absolute 07/15/2019 0.55  0.10 - 0.90 10*3/mm3 Final   • Eosinophils, Absolute 07/15/2019 0.40  0.00 - 0.40 10*3/mm3 Final   • Basophils, Absolute 07/15/2019 0.04  0.00 - 0.20 10*3/mm3 Final   • Immature Grans, Absolute 07/15/2019 0.02  0.00 - 0.05 10*3/mm3 Final   • nRBC 07/15/2019 0.0  0.0 - 0.2 /100 WBC Final     Assessment/Plan      1. Special screening for malignant neoplasm of colon    2. FH: colon cancer    .       Orders placed during this encounter include:  Orders Placed This Encounter   Procedures   • Follow Anesthesia Guidelines / Standing Orders     Standing Status:   Future   • Obtain Informed Consent     Standing Status:   Future     Order Specific Question:   Informed Consent Given For     Answer:   COLONOSCOPY       COLONOSCOPY (N/A)    Review and/or summary of lab tests, radiology, procedures, medications. Review and summary of old records and obtaining of history. The risks and benefits of my recommendations, as well as other treatment options were discussed with the patient today. Questions were answered.    New Medications Ordered This Visit   Medications   • polyethylene glycol (GoLYTELY) 236 g solution     Sig: Starting at noon on day prior to procedure, drink 8 ounces every 30 minutes until all gone or stools are clear. May add flavor packet.     Dispense:  4000 mL     Refill:  0       Follow-up: Return in about 4 weeks (around 12/17/2019) for After test.          This document has been electronically signed by CANDE Vogel on November 19, 2019  3:49 PM             Results for orders placed or performed in visit on 07/15/19   CBC Auto Differential   Result Value Ref Range    WBC 11.11 (H) 3.40 - 10.80 10*3/mm3    RBC 4.45 3.77 - 5.28 10*6/mm3    Hemoglobin 14.3 12.0 - 15.9 g/dL    Hematocrit 41.7 34.0 - 46.6 %    MCV 93.7 79.0 - 97.0 fL    MCH 32.1 26.6 - 33.0 pg    MCHC 34.3 31.5 - 35.7 g/dL    RDW 13.3 12.3 - 15.4 %    RDW-SD 45.4 37.0 - 54.0 fl    MPV 11.0 6.0 - 12.0 fL    Platelets 395 140 - 450 10*3/mm3    Neutrophil % 48.6 42.7 - 76.0 %    Lymphocyte % 42.2 19.6 - 45.3 %    Monocyte % 5.0 5.0 - 12.0 %    Eosinophil % 3.6 0.3 - 6.2 %    Basophil % 0.4 0.0 - 1.5 %    Immature Grans % 0.2 0.0 - 0.5 %    Neutrophils, Absolute 5.41 1.70 - 7.00 10*3/mm3    Lymphocytes, Absolute 4.69 (H) 0.70 - 3.10 10*3/mm3    Monocytes, Absolute 0.55 0.10 - 0.90 10*3/mm3    Eosinophils, Absolute 0.40 0.00 - 0.40 10*3/mm3    Basophils, Absolute 0.04 0.00 - 0.20 10*3/mm3    Immature Grans, Absolute 0.02 0.00 - 0.05 10*3/mm3    nRBC 0.0 0.0 - 0.2 /100 WBC   Comprehensive Metabolic Panel   Result Value Ref Range    Glucose 101 (H) 65 - 99 mg/dL    BUN 5 (L) 6 - 20 mg/dL    Creatinine 0.63 0.57 - 1.00 mg/dL    Sodium 141 136 - 145 mmol/L    Potassium 3.5 3.5 - 5.2 mmol/L    Chloride 103 98 - 107 mmol/L    CO2 26.0 22.0 - 29.0 mmol/L    Calcium 9.4 8.6 - 10.5 mg/dL    Total Protein 7.9 6.0 - 8.5 g/dL    Albumin 4.60 3.50 - 5.20 g/dL    ALT (SGPT) 13 1 - 33 U/L    AST (SGOT) 15 1 - 32 U/L    Alkaline Phosphatase 102 39 - 117 U/L    Total Bilirubin 0.4 0.2 - 1.2 mg/dL    eGFR Non African Amer 108 >60 mL/min/1.73    Globulin 3.3 gm/dL    A/G Ratio 1.4 g/dL    BUN/Creatinine Ratio 7.9 7.0 - 25.0    Anion Gap 12.0 5.0 - 15.0 mmol/L   Results for orders placed or performed during the hospital encounter of 11/07/18   Pregnancy, Urine - Urine, Clean Catch   Result Value Ref Range    HCG, Urine QL Negative Negative   Results for orders placed or performed in visit on 07/25/18    Liquid-based Pap Smear, Screening   Result Value Ref Range    Case Report       Gynecologic Cytology Report                       Case: [id number]                                  Authorizing Provider:  Submitter Client           Collected:           07/25/2018 08:40 AM          First Screen:          Alexa Reagan         Received:            07/27/2018 08:41 AM          Pathologist:           Herson Dunham MD                                                          Specimen:    Liquid-Based Pap, Screening, Cervix, Endocervix                                            Interpretation Negative for intraepithelial lesion or malignancy      Other Findings       Reactive cellular changes  Fungal organisms morphologically consistent with Candida spp  Mild inflammation    Specimen Adequacy       Satisfactory for evaluation, endocervical/transformation zone component present    LMP 07-     Additional Information       Disclaimer: Cervical cytology is a screening test primarily for squamous cancer and its precursors and has associated false-negative and false-positive results.  Technologies such as liquid-based preparations may decrease but will not eliminate all false-negative results.  Follow-up of unexplained clinical signs and symptoms is recommended to minimize false-negative results. (The [employer] System for Reporting Cervical Cytology: Gonzalez, 2015).     Results for orders placed or performed during the hospital encounter of 05/11/15   Cannabinoid Confirmation, Ur   Result Value Ref Range    Please note Comment     THC, Urine Positive (A)     Carboxy THC, Urine 83 CUTOFF=10 ng/mL   Hemoglobin and hematocrit, blood   Result Value Ref Range    Hemoglobin 9.8 (L) 12.0 - 15.5 gm/dl    Hematocrit 27.8 (L) 35.0 - 45.0 %   Rapid drug screen, urine   Result Value Ref Range    Barbiturates Screen, Urine Negative NEGATIVE    Benzodiazepine Screen, Urine Negative NEGATIVE    Opiate Screen, Urine Negative  NEGATIVE    THC Screen Interpretation POSITIVE (A) NEGATIVE    Amphet/Methamphet, Screen, Urine Negative NEGATIVE    Cocaine Screen, Urine Negative NEGATIVE    Oxycodone Screen, Urine Negative NEGATIVE    Methadone Screen, Urine Negative NEGATIVE   RPR   Result Value Ref Range    RPR Non Reactive Non Reactive   CBC and Differential   Result Value Ref Range    WBC 16.1 (H) 3.2 - 9.8 x1000/uL    RBC 4.17 3.77 - 5.16 amelia/mm3    Hemoglobin 11.9 (L) 12.0 - 15.5 gm/dl    Hematocrit 34.6 (L) 35.0 - 45.0 %    MCV 83.0 80.0 - 98.0 fl    MCH 28.5 26.0 - 34.0 pg    MCHC 34.4 31.4 - 36.0 gm/dl    RDW 14.0 11.5 - 14.5 %    Platelets 319 150 - 450 x1000/mm3    MPV 11.8 8.0 - 12.0 fl    Neutrophil Rel % 67.4 37.0 - 80.0 %    Lymphocyte Rel % 25.3 10.0 - 50.0 %    Monocyte Rel % 5.1 0.0 - 12.0 %    Eosinophil Rel % 1.3 0.0 - 7.0 %    Basophil Rel % 0.2 0.0 - 2.0 %    Immature Granulocyte Rel % 0.70 (H) 0.00 - 0.50 %    Neutrophils Absolute 10.87 (H) 2.00 - 8.60 x1000/uL    Lymphocytes Absolute 4.09 0.60 - 4.20 x1000/uL    Monocytes Absolute 0.82 0.00 - 0.90 x1000/uL    Eosinophils Absolute 0.21 0.00 - 0.70 x1000/uL    Basophils Absolute 0.03 0.00 - 0.20 x1000/uL    Immature Granulocytes Absolute 0.120 (H) 0.005 - 0.022 x1000/uL   Type and screen   Result Value Ref Range    DOES A PREVIOUS ABORH EXIST? PREVIOUS TYPE ON FILE     ABO Type B     RH type POS     Antibody Screen Interpretation NEG    Results for orders placed or performed in visit on 02/09/15   Glucose, Post Prandial 1 Hr   Result Value Ref Range    Glucose, 1Hr  60 - 140 mg/dl   CBC and Differential   Result Value Ref Range    WBC 15.9 (H) 3.2 - 9.8 x1000/uL    RBC 3.67 (L) 3.77 - 5.16 amelia/mm3    Hemoglobin 11.2 (L) 12.0 - 15.5 gm/dl    Hematocrit 32.8 (L) 35.0 - 45.0 %    MCV 89.4 80.0 - 98.0 fl    MCH 30.5 26.0 - 34.0 pg    MCHC 34.1 31.4 - 36.0 gm/dl    RDW 13.0 11.5 - 14.5 %    Platelets 305 150 - 450 x1000/mm3    MPV 11.5 8.0 - 12.0 fl    Neutrophil Rel %  75.4 37.0 - 80.0 %    Lymphocyte Rel % 17.7 10.0 - 50.0 %    Monocyte Rel % 4.7 0.0 - 12.0 %    Eosinophil Rel % 1.6 0.0 - 7.0 %    Basophil Rel % 0.1 0.0 - 2.0 %    Immature Granulocyte Rel % 0.50 0.00 - 0.50 %    Neutrophils Absolute 11.97 (H) 2.00 - 8.60 x1000/uL    Lymphocytes Absolute 2.81 0.60 - 4.20 x1000/uL    Monocytes Absolute 0.74 0.00 - 0.90 x1000/uL    Eosinophils Absolute 0.25 0.00 - 0.70 x1000/uL    Basophils Absolute 0.02 0.00 - 0.20 x1000/uL    Immature Granulocytes Absolute 0.080 (H) 0.005 - 0.022 x1000/uL     *Note: Due to a large number of results and/or encounters for the requested time period, some results have not been displayed. A complete set of results can be found in Results Review.

## 2019-11-25 ENCOUNTER — HOSPITAL ENCOUNTER (OUTPATIENT)
Facility: HOSPITAL | Age: 34
Setting detail: HOSPITAL OUTPATIENT SURGERY
Discharge: HOME | End: 2019-11-25
Attending: INTERNAL MEDICINE | Admitting: INTERNAL MEDICINE

## 2019-11-25 ENCOUNTER — ANESTHESIA EVENT (OUTPATIENT)
Dept: GASTROENTEROLOGY | Facility: HOSPITAL | Age: 34
End: 2019-11-25

## 2019-11-25 ENCOUNTER — ANESTHESIA (OUTPATIENT)
Dept: GASTROENTEROLOGY | Facility: HOSPITAL | Age: 34
End: 2019-11-25

## 2019-11-25 VITALS
BODY MASS INDEX: 19.42 KG/M2 | WEIGHT: 109.6 LBS | RESPIRATION RATE: 18 BRPM | HEIGHT: 63 IN | OXYGEN SATURATION: 100 % | HEART RATE: 83 BPM | DIASTOLIC BLOOD PRESSURE: 72 MMHG | SYSTOLIC BLOOD PRESSURE: 120 MMHG | TEMPERATURE: 96.6 F

## 2019-11-25 DIAGNOSIS — Z12.11 SPECIAL SCREENING FOR MALIGNANT NEOPLASM OF COLON: ICD-10-CM

## 2019-11-25 DIAGNOSIS — Z80.0 FH: COLON CANCER: ICD-10-CM

## 2019-11-25 PROCEDURE — 45378 DIAGNOSTIC COLONOSCOPY: CPT | Performed by: INTERNAL MEDICINE

## 2019-11-25 PROCEDURE — 25010000002 PROPOFOL 10 MG/ML EMULSION: Performed by: NURSE ANESTHETIST, CERTIFIED REGISTERED

## 2019-11-25 RX ORDER — DEXTROSE AND SODIUM CHLORIDE 5; .45 G/100ML; G/100ML
30 INJECTION, SOLUTION INTRAVENOUS CONTINUOUS PRN
Status: DISCONTINUED | OUTPATIENT
Start: 2019-11-25 | End: 2019-11-25 | Stop reason: HOSPADM

## 2019-11-25 RX ORDER — PROPOFOL 10 MG/ML
VIAL (ML) INTRAVENOUS AS NEEDED
Status: DISCONTINUED | OUTPATIENT
Start: 2019-11-25 | End: 2019-11-25 | Stop reason: SURG

## 2019-11-25 RX ORDER — LIDOCAINE HYDROCHLORIDE 20 MG/ML
INJECTION, SOLUTION EPIDURAL; INFILTRATION; INTRACAUDAL; PERINEURAL AS NEEDED
Status: DISCONTINUED | OUTPATIENT
Start: 2019-11-25 | End: 2019-11-25 | Stop reason: SURG

## 2019-11-25 RX ORDER — ONDANSETRON 2 MG/ML
4 INJECTION INTRAMUSCULAR; INTRAVENOUS ONCE AS NEEDED
Status: DISCONTINUED | OUTPATIENT
Start: 2019-11-25 | End: 2019-11-25 | Stop reason: HOSPADM

## 2019-11-25 RX ADMIN — LIDOCAINE HYDROCHLORIDE 50 MG: 20 INJECTION, SOLUTION EPIDURAL; INFILTRATION; INTRACAUDAL; PERINEURAL at 16:10

## 2019-11-25 RX ADMIN — PROPOFOL 30 MG: 10 INJECTION, EMULSION INTRAVENOUS at 16:14

## 2019-11-25 RX ADMIN — PROPOFOL 30 MG: 10 INJECTION, EMULSION INTRAVENOUS at 16:16

## 2019-11-25 RX ADMIN — PROPOFOL 70 MG: 10 INJECTION, EMULSION INTRAVENOUS at 16:12

## 2019-11-25 RX ADMIN — DEXTROSE AND SODIUM CHLORIDE 30 ML/HR: 5; 450 INJECTION, SOLUTION INTRAVENOUS at 15:52

## 2019-11-25 RX ADMIN — PROPOFOL 30 MG: 10 INJECTION, EMULSION INTRAVENOUS at 16:18

## 2019-11-25 NOTE — ANESTHESIA POSTPROCEDURE EVALUATION
Patient: Gisella Aldana    Procedure Summary     Date:  11/25/19 Room / Location:  [facility] ENDOSCOPY 1 / [facility] ENDOSCOPY    Anesthesia Start:  1610 Anesthesia Stop:  1623    Procedure:  COLONOSCOPY (N/A ) Diagnosis:       Special screening for malignant neoplasm of colon      FH: colon cancer      (Special screening for malignant neoplasm of colon [Z12.11])      (FH: colon cancer [Z80.0])    Surgeon:  Kristofer Maldonado MD Provider:  Kiara Jiménez CRNA    Anesthesia Type:  MAC ASA Status:  1          Anesthesia Type: MAC  Last vitals  BP   118/73 (11/25/19 1540)   Temp   97.9 °F (36.6 °C) (11/25/19 1540)   Pulse   82 (11/25/19 1540)   Resp   16 (11/25/19 1540)     SpO2   98 % (11/25/19 1540)     Post Anesthesia Care and Evaluation    Patient location during evaluation: bedside  Patient participation: complete - patient participated  Level of consciousness: awake  Pain score: 0  Pain management: adequate  Airway patency: patent  Anesthetic complications: No anesthetic complications  PONV Status: none  Cardiovascular status: acceptable  Respiratory status: acceptable  Hydration status: acceptable

## 2019-11-25 NOTE — ANESTHESIA PREPROCEDURE EVALUATION
Anesthesia Evaluation     Patient summary reviewed and Nursing notes reviewed   NPO Solid Status: > 8 hours  NPO Liquid Status: > 8 hours           Airway   Mallampati: II  TM distance: >3 FB  Neck ROM: full  No difficulty expected  Dental - normal exam     Pulmonary - negative pulmonary ROS and normal exam   Cardiovascular - negative cardio ROS and normal exam        Neuro/Psych- negative ROS  GI/Hepatic/Renal/Endo - negative ROS     Musculoskeletal (-) negative ROS    Abdominal  - normal exam   Substance History - negative use     OB/GYN negative ob/gyn ROS         Other - negative ROS                       Anesthesia Plan    ASA 1     MAC     intravenous induction     Anesthetic plan, all risks, benefits, and alternatives have been provided, discussed and informed consent has been obtained with: patient.

## 2019-12-02 ENCOUNTER — OFFICE VISIT (OUTPATIENT)
Dept: GASTROENTEROLOGY | Facility: CLINIC | Age: 34
End: 2019-12-02

## 2019-12-02 VITALS
HEART RATE: 82 BPM | DIASTOLIC BLOOD PRESSURE: 68 MMHG | SYSTOLIC BLOOD PRESSURE: 102 MMHG | HEIGHT: 63 IN | WEIGHT: 110.2 LBS | BODY MASS INDEX: 19.53 KG/M2

## 2019-12-02 DIAGNOSIS — Z80.0 FH: COLON CANCER IN RELATIVE <50 YEARS OLD: Primary | ICD-10-CM

## 2019-12-02 DIAGNOSIS — K64.9 HEMORRHOIDS, UNSPECIFIED HEMORRHOID TYPE: ICD-10-CM

## 2019-12-02 PROCEDURE — 99212 OFFICE O/P EST SF 10 MIN: CPT | Performed by: NURSE PRACTITIONER

## 2019-12-02 NOTE — PATIENT INSTRUCTIONS

## 2019-12-02 NOTE — PROGRESS NOTES
Chief Complaint   Patient presents with   • screening results       Subjective    Gisella Aldana is a 34 y.o. female. she is here today for follow-up.    History of Present Illness  34-year-old female presents to discuss screening colonoscopy.  Denies any abdominal pain, nausea, vomiting or change within her bowel habits.  Her mother  of colon cancer at age 35.  Colonoscopy was completed 2019 noted adequate prep.  Perianal digital rectal exam was normal hemorrhoids were noted otherwise normal exam.  Repeat is recommended in 5 years for surveillance.        The following portions of the patient's history were reviewed and updated as appropriate:   Past Medical History:   Diagnosis Date   • Encounter for  visit      visit status      • Surgical follow-up care    • Vomiting of pregnancy    • Vomiting of pregnancy, unspecified     Unspecified vomiting of pregnancy, antepartum condition or complication        Past Surgical History:   Procedure Laterality Date   •  SECTION  2015     Repeat low transverse  section.    • COLONOSCOPY N/A 2018    Procedure: COLONOSCOPY;  Surgeon: Kristofer Maldonado MD;  Location: [facility] ENDOSCOPY;  Service: Gastroenterology   • COLONOSCOPY N/A 2019    Procedure: COLONOSCOPY;  Surgeon: Kristofer Maldonado MD;  Location: [facility] ENDOSCOPY;  Service: Gastroenterology     Family History   Problem Relation Age of Onset   • Cancer Mother         colon cancer   • Cancer Father         Lung cancer   • Hypertension Paternal Grandmother    • Cancer Maternal Grandmother         lymphoma   • Heart disease Paternal Grandfather      OB History      Para Term  AB Living    2 2       2    SAB TAB Ectopic Molar Multiple Live Births                       Prior to Admission medications    Medication Sig Start Date End Date Taking? Authorizing Provider   albuterol sulfate  (90 Base) MCG/ACT inhaler Inhale 2 puffs Every 4 (Four)  "Hours As Needed for Wheezing. 2/1/19  Yes Abdon Duarte MD     Allergies   Allergen Reactions   • Amoxicillin Rash     Social History     Socioeconomic History   • Marital status:      Spouse name: Not on file   • Number of children: Not on file   • Years of education: Not on file   • Highest education level: Not on file   Tobacco Use   • Smoking status: Never Smoker   • Smokeless tobacco: Never Used   Substance and Sexual Activity   • Alcohol use: No   • Drug use: No   • Sexual activity: Yes     Birth control/protection: None       Review of Systems  Review of Systems   Constitutional: Negative for activity change, appetite change, chills, diaphoresis, fatigue, fever and unexpected weight change.   HENT: Negative for sore throat and trouble swallowing.    Respiratory: Negative for shortness of breath.    Gastrointestinal: Negative for abdominal distention, abdominal pain, anal bleeding, blood in stool, constipation, diarrhea, nausea, rectal pain and vomiting.   Musculoskeletal: Negative for arthralgias.   Skin: Negative for pallor.   Neurological: Negative for light-headedness.        /68 (BP Location: Left arm)   Pulse 82   Ht 160 cm (63\")   Wt 50 kg (110 lb 3.2 oz)   LMP 11/23/2019 (Exact Date)   BMI 19.52 kg/m²     Objective    Physical Exam   Constitutional: She is oriented to person, place, and time. She appears well-developed and well-nourished. She is cooperative. No distress.   HENT:   Head: Normocephalic and atraumatic.   Neck: Normal range of motion. Neck supple. No thyromegaly present.   Cardiovascular: Normal rate, regular rhythm and normal heart sounds.   Pulmonary/Chest: Effort normal and breath sounds normal. She has no wheezes. She has no rhonchi. She has no rales.   Abdominal: Soft. Normal appearance and bowel sounds are normal. She exhibits no shifting dullness, no distension, no fluid wave and no ascites. There is no hepatosplenomegaly. There is no tenderness. There is " no rigidity and no guarding. No hernia.   Lymphadenopathy:     She has no cervical adenopathy.   Neurological: She is alert and oriented to person, place, and time.   Skin: Skin is warm, dry and intact. No rash noted. No pallor.   Psychiatric: She has a normal mood and affect. Her speech is normal.     Office Visit on 07/15/2019   Component Date Value Ref Range Status   • Glucose 07/15/2019 101* 65 - 99 mg/dL Final   • BUN 07/15/2019 5* 6 - 20 mg/dL Final   • Creatinine 07/15/2019 0.63  0.57 - 1.00 mg/dL Final   • Sodium 07/15/2019 141  136 - 145 mmol/L Final   • Potassium 07/15/2019 3.5  3.5 - 5.2 mmol/L Final   • Chloride 07/15/2019 103  98 - 107 mmol/L Final   • CO2 07/15/2019 26.0  22.0 - 29.0 mmol/L Final   • Calcium 07/15/2019 9.4  8.6 - 10.5 mg/dL Final   • Total Protein 07/15/2019 7.9  6.0 - 8.5 g/dL Final   • Albumin 07/15/2019 4.60  3.50 - 5.20 g/dL Final   • ALT (SGPT) 07/15/2019 13  1 - 33 U/L Final   • AST (SGOT) 07/15/2019 15  1 - 32 U/L Final   • Alkaline Phosphatase 07/15/2019 102  39 - 117 U/L Final   • Total Bilirubin 07/15/2019 0.4  0.2 - 1.2 mg/dL Final   • eGFR Non African Amer 07/15/2019 108  >60 mL/min/1.73 Final   • Globulin 07/15/2019 3.3  gm/dL Final   • A/G Ratio 07/15/2019 1.4  g/dL Final   • BUN/Creatinine Ratio 07/15/2019 7.9  7.0 - 25.0 Final   • Anion Gap 07/15/2019 12.0  5.0 - 15.0 mmol/L Final   • WBC 07/15/2019 11.11* 3.40 - 10.80 10*3/mm3 Final   • RBC 07/15/2019 4.45  3.77 - 5.28 10*6/mm3 Final   • Hemoglobin 07/15/2019 14.3  12.0 - 15.9 g/dL Final   • Hematocrit 07/15/2019 41.7  34.0 - 46.6 % Final   • MCV 07/15/2019 93.7  79.0 - 97.0 fL Final   • MCH 07/15/2019 32.1  26.6 - 33.0 pg Final   • MCHC 07/15/2019 34.3  31.5 - 35.7 g/dL Final   • RDW 07/15/2019 13.3  12.3 - 15.4 % Final   • RDW-SD 07/15/2019 45.4  37.0 - 54.0 fl Final   • MPV 07/15/2019 11.0  6.0 - 12.0 fL Final   • Platelets 07/15/2019 395  140 - 450 10*3/mm3 Final   • Neutrophil % 07/15/2019 48.6  42.7 - 76.0 %  Final   • Lymphocyte % 07/15/2019 42.2  19.6 - 45.3 % Final   • Monocyte % 07/15/2019 5.0  5.0 - 12.0 % Final   • Eosinophil % 07/15/2019 3.6  0.3 - 6.2 % Final   • Basophil % 07/15/2019 0.4  0.0 - 1.5 % Final   • Immature Grans % 07/15/2019 0.2  0.0 - 0.5 % Final   • Neutrophils, Absolute 07/15/2019 5.41  1.70 - 7.00 10*3/mm3 Final   • Lymphocytes, Absolute 07/15/2019 4.69* 0.70 - 3.10 10*3/mm3 Final   • Monocytes, Absolute 07/15/2019 0.55  0.10 - 0.90 10*3/mm3 Final   • Eosinophils, Absolute 07/15/2019 0.40  0.00 - 0.40 10*3/mm3 Final   • Basophils, Absolute 07/15/2019 0.04  0.00 - 0.20 10*3/mm3 Final   • Immature Grans, Absolute 07/15/2019 0.02  0.00 - 0.05 10*3/mm3 Final   • nRBC 07/15/2019 0.0  0.0 - 0.2 /100 WBC Final     Assessment/Plan      1. FH: colon cancer in relative <50 years old    2. Hemorrhoids, unspecified hemorrhoid type    .   Hemorrhoids are not causing any problems at this point may try over-the-counter regimen if she begins to have issue with him.  Follow-up in 5 years for repeat colonoscopy return to GI office sooner if needed  Orders placed during this encounter include:  No orders of the defined types were placed in this encounter.      * Surgery not found *    Review and/or summary of lab tests, radiology, procedures, medications. Review and summary of old records and obtaining of history. The risks and benefits of my recommendations, as well as other treatment options were discussed with the patient today. Questions were answered.    No orders of the defined types were placed in this encounter.      Follow-up: Return in about 5 years (around 12/2/2024).          This document has been electronically signed by CANDE Vogel on December 2, 2019 11:05 AM             Results for orders placed or performed in visit on 07/15/19   CBC Auto Differential   Result Value Ref Range    WBC 11.11 (H) 3.40 - 10.80 10*3/mm3    RBC 4.45 3.77 - 5.28 10*6/mm3    Hemoglobin 14.3 12.0 - 15.9 g/dL     Hematocrit 41.7 34.0 - 46.6 %    MCV 93.7 79.0 - 97.0 fL    MCH 32.1 26.6 - 33.0 pg    MCHC 34.3 31.5 - 35.7 g/dL    RDW 13.3 12.3 - 15.4 %    RDW-SD 45.4 37.0 - 54.0 fl    MPV 11.0 6.0 - 12.0 fL    Platelets 395 140 - 450 10*3/mm3    Neutrophil % 48.6 42.7 - 76.0 %    Lymphocyte % 42.2 19.6 - 45.3 %    Monocyte % 5.0 5.0 - 12.0 %    Eosinophil % 3.6 0.3 - 6.2 %    Basophil % 0.4 0.0 - 1.5 %    Immature Grans % 0.2 0.0 - 0.5 %    Neutrophils, Absolute 5.41 1.70 - 7.00 10*3/mm3    Lymphocytes, Absolute 4.69 (H) 0.70 - 3.10 10*3/mm3    Monocytes, Absolute 0.55 0.10 - 0.90 10*3/mm3    Eosinophils, Absolute 0.40 0.00 - 0.40 10*3/mm3    Basophils, Absolute 0.04 0.00 - 0.20 10*3/mm3    Immature Grans, Absolute 0.02 0.00 - 0.05 10*3/mm3    nRBC 0.0 0.0 - 0.2 /100 WBC   Comprehensive Metabolic Panel   Result Value Ref Range    Glucose 101 (H) 65 - 99 mg/dL    BUN 5 (L) 6 - 20 mg/dL    Creatinine 0.63 0.57 - 1.00 mg/dL    Sodium 141 136 - 145 mmol/L    Potassium 3.5 3.5 - 5.2 mmol/L    Chloride 103 98 - 107 mmol/L    CO2 26.0 22.0 - 29.0 mmol/L    Calcium 9.4 8.6 - 10.5 mg/dL    Total Protein 7.9 6.0 - 8.5 g/dL    Albumin 4.60 3.50 - 5.20 g/dL    ALT (SGPT) 13 1 - 33 U/L    AST (SGOT) 15 1 - 32 U/L    Alkaline Phosphatase 102 39 - 117 U/L    Total Bilirubin 0.4 0.2 - 1.2 mg/dL    eGFR Non African Amer 108 >60 mL/min/1.73    Globulin 3.3 gm/dL    A/G Ratio 1.4 g/dL    BUN/Creatinine Ratio 7.9 7.0 - 25.0    Anion Gap 12.0 5.0 - 15.0 mmol/L   Results for orders placed or performed during the hospital encounter of 11/07/18   Pregnancy, Urine - Urine, Clean Catch   Result Value Ref Range    HCG, Urine QL Negative Negative   Results for orders placed or performed in visit on 07/25/18   Liquid-based Pap Smear, Screening   Result Value Ref Range    Case Report       Gynecologic Cytology Report                       Case: [id number]                                  Authorizing Provider:  Submitter Client           Collected:            07/25/2018 08:40 AM          First Screen:          Alexa OTOOLE Moizmarquis         Received:            07/27/2018 08:41 AM          Pathologist:           Herson Dunham MD                                                          Specimen:    Liquid-Based Pap, Screening, Cervix, Endocervix                                            Interpretation Negative for intraepithelial lesion or malignancy      Other Findings       Reactive cellular changes  Fungal organisms morphologically consistent with Candida spp  Mild inflammation    Specimen Adequacy       Satisfactory for evaluation, endocervical/transformation zone component present    LMP 07-     Additional Information       Disclaimer: Cervical cytology is a screening test primarily for squamous cancer and its precursors and has associated false-negative and false-positive results.  Technologies such as liquid-based preparations may decrease but will not eliminate all false-negative results.  Follow-up of unexplained clinical signs and symptoms is recommended to minimize false-negative results. (The [place] System for Reporting Cervical Cytology: Gonzalez, 2015).     Results for orders placed or performed during the hospital encounter of 05/11/15   Cannabinoid Confirmation, Ur   Result Value Ref Range    Please note Comment     THC, Urine Positive (A)     Carboxy THC, Urine 83 CUTOFF=10 ng/mL   Hemoglobin and hematocrit, blood   Result Value Ref Range    Hemoglobin 9.8 (L) 12.0 - 15.5 gm/dl    Hematocrit 27.8 (L) 35.0 - 45.0 %   Rapid drug screen, urine   Result Value Ref Range    Barbiturates Screen, Urine Negative NEGATIVE    Benzodiazepine Screen, Urine Negative NEGATIVE    Opiate Screen, Urine Negative NEGATIVE    THC Screen Interpretation POSITIVE (A) NEGATIVE    Amphet/Methamphet, Screen, Urine Negative NEGATIVE    Cocaine Screen, Urine Negative NEGATIVE    Oxycodone Screen, Urine Negative NEGATIVE    Methadone Screen, Urine Negative NEGATIVE   RPR    Result Value Ref Range    RPR Non Reactive Non Reactive   CBC and Differential   Result Value Ref Range    WBC 16.1 (H) 3.2 - 9.8 x1000/uL    RBC 4.17 3.77 - 5.16 amelia/mm3    Hemoglobin 11.9 (L) 12.0 - 15.5 gm/dl    Hematocrit 34.6 (L) 35.0 - 45.0 %    MCV 83.0 80.0 - 98.0 fl    MCH 28.5 26.0 - 34.0 pg    MCHC 34.4 31.4 - 36.0 gm/dl    RDW 14.0 11.5 - 14.5 %    Platelets 319 150 - 450 x1000/mm3    MPV 11.8 8.0 - 12.0 fl    Neutrophil Rel % 67.4 37.0 - 80.0 %    Lymphocyte Rel % 25.3 10.0 - 50.0 %    Monocyte Rel % 5.1 0.0 - 12.0 %    Eosinophil Rel % 1.3 0.0 - 7.0 %    Basophil Rel % 0.2 0.0 - 2.0 %    Immature Granulocyte Rel % 0.70 (H) 0.00 - 0.50 %    Neutrophils Absolute 10.87 (H) 2.00 - 8.60 x1000/uL    Lymphocytes Absolute 4.09 0.60 - 4.20 x1000/uL    Monocytes Absolute 0.82 0.00 - 0.90 x1000/uL    Eosinophils Absolute 0.21 0.00 - 0.70 x1000/uL    Basophils Absolute 0.03 0.00 - 0.20 x1000/uL    Immature Granulocytes Absolute 0.120 (H) 0.005 - 0.022 x1000/uL   Type and screen   Result Value Ref Range    DOES A PREVIOUS ABORH EXIST? PREVIOUS TYPE ON FILE     ABO Type B     RH type POS     Antibody Screen Interpretation NEG    Results for orders placed or performed in visit on 02/09/15   Glucose, Post Prandial 1 Hr   Result Value Ref Range    Glucose, 1Hr  60 - 140 mg/dl   CBC and Differential   Result Value Ref Range    WBC 15.9 (H) 3.2 - 9.8 x1000/uL    RBC 3.67 (L) 3.77 - 5.16 amelia/mm3    Hemoglobin 11.2 (L) 12.0 - 15.5 gm/dl    Hematocrit 32.8 (L) 35.0 - 45.0 %    MCV 89.4 80.0 - 98.0 fl    MCH 30.5 26.0 - 34.0 pg    MCHC 34.1 31.4 - 36.0 gm/dl    RDW 13.0 11.5 - 14.5 %    Platelets 305 150 - 450 x1000/mm3    MPV 11.5 8.0 - 12.0 fl    Neutrophil Rel % 75.4 37.0 - 80.0 %    Lymphocyte Rel % 17.7 10.0 - 50.0 %    Monocyte Rel % 4.7 0.0 - 12.0 %    Eosinophil Rel % 1.6 0.0 - 7.0 %    Basophil Rel % 0.1 0.0 - 2.0 %    Immature Granulocyte Rel % 0.50 0.00 - 0.50 %    Neutrophils Absolute 11.97 (H) 2.00 -  8.60 x1000/uL    Lymphocytes Absolute 2.81 0.60 - 4.20 x1000/uL    Monocytes Absolute 0.74 0.00 - 0.90 x1000/uL    Eosinophils Absolute 0.25 0.00 - 0.70 x1000/uL    Basophils Absolute 0.02 0.00 - 0.20 x1000/uL    Immature Granulocytes Absolute 0.080 (H) 0.005 - 0.022 x1000/uL     *Note: Due to a large number of results and/or encounters for the requested time period, some results have not been displayed. A complete set of results can be found in Results Review.

## 2019-12-26 ENCOUNTER — OFFICE VISIT (OUTPATIENT)
Dept: FAMILY MEDICINE CLINIC | Facility: CLINIC | Age: 34
End: 2019-12-26

## 2019-12-26 VITALS
DIASTOLIC BLOOD PRESSURE: 70 MMHG | BODY MASS INDEX: 18.32 KG/M2 | WEIGHT: 103.4 LBS | HEART RATE: 80 BPM | TEMPERATURE: 97.4 F | SYSTOLIC BLOOD PRESSURE: 120 MMHG | HEIGHT: 63 IN | OXYGEN SATURATION: 100 %

## 2019-12-26 DIAGNOSIS — R68.89 FLU-LIKE SYMPTOMS: Primary | ICD-10-CM

## 2019-12-26 PROCEDURE — 99213 OFFICE O/P EST LOW 20 MIN: CPT | Performed by: STUDENT IN AN ORGANIZED HEALTH CARE EDUCATION/TRAINING PROGRAM

## 2019-12-26 RX ORDER — GUAIFENESIN/DEXTROMETHORPHAN 100-10MG/5
5 SYRUP ORAL 3 TIMES DAILY PRN
Qty: 236 ML | Refills: 0 | Status: SHIPPED | OUTPATIENT
Start: 2019-12-26 | End: 2022-09-15

## 2022-09-09 ENCOUNTER — APPOINTMENT (OUTPATIENT)
Dept: ULTRASOUND IMAGING | Facility: HOSPITAL | Age: 37
End: 2022-09-09

## 2022-09-09 ENCOUNTER — HOSPITAL ENCOUNTER (EMERGENCY)
Facility: HOSPITAL | Age: 37
Discharge: LEFT BEFORE DISCHARGE | End: 2022-09-09
Attending: STUDENT IN AN ORGANIZED HEALTH CARE EDUCATION/TRAINING PROGRAM | Admitting: STUDENT IN AN ORGANIZED HEALTH CARE EDUCATION/TRAINING PROGRAM

## 2022-09-09 VITALS
HEIGHT: 64 IN | SYSTOLIC BLOOD PRESSURE: 118 MMHG | OXYGEN SATURATION: 99 % | DIASTOLIC BLOOD PRESSURE: 58 MMHG | RESPIRATION RATE: 18 BRPM | BODY MASS INDEX: 18.68 KG/M2 | WEIGHT: 109.4 LBS | TEMPERATURE: 98.1 F | HEART RATE: 98 BPM

## 2022-09-09 DIAGNOSIS — N93.9 VAGINAL BLEEDING: Primary | ICD-10-CM

## 2022-09-09 LAB
ABO GROUP BLD: NORMAL
ALBUMIN SERPL-MCNC: 4.3 G/DL (ref 3.5–5.2)
ALBUMIN/GLOB SERPL: 1.4 G/DL
ALP SERPL-CCNC: 87 U/L (ref 39–117)
ALT SERPL W P-5'-P-CCNC: 6 U/L (ref 1–33)
ANION GAP SERPL CALCULATED.3IONS-SCNC: 10 MMOL/L (ref 5–15)
AST SERPL-CCNC: 11 U/L (ref 1–32)
BACTERIA UR QL AUTO: ABNORMAL /HPF
BASOPHILS # BLD AUTO: 0.04 10*3/MM3 (ref 0–0.2)
BASOPHILS NFR BLD AUTO: 0.4 % (ref 0–1.5)
BILIRUB SERPL-MCNC: 0.5 MG/DL (ref 0–1.2)
BILIRUB UR QL STRIP: NEGATIVE
BUN SERPL-MCNC: 4 MG/DL (ref 6–20)
BUN/CREAT SERPL: 7 (ref 7–25)
CALCIUM SPEC-SCNC: 9.1 MG/DL (ref 8.6–10.5)
CHLORIDE SERPL-SCNC: 109 MMOL/L (ref 98–107)
CLARITY UR: CLEAR
CO2 SERPL-SCNC: 24 MMOL/L (ref 22–29)
COLOR UR: YELLOW
CREAT SERPL-MCNC: 0.57 MG/DL (ref 0.57–1)
DEPRECATED RDW RBC AUTO: 48.5 FL (ref 37–54)
EGFRCR SERPLBLD CKD-EPI 2021: 119.5 ML/MIN/1.73
EOSINOPHIL # BLD AUTO: 0.13 10*3/MM3 (ref 0–0.4)
EOSINOPHIL NFR BLD AUTO: 1.4 % (ref 0.3–6.2)
ERYTHROCYTE [DISTWIDTH] IN BLOOD BY AUTOMATED COUNT: 13.8 % (ref 12.3–15.4)
GLOBULIN UR ELPH-MCNC: 3 GM/DL
GLUCOSE SERPL-MCNC: 90 MG/DL (ref 65–99)
GLUCOSE UR STRIP-MCNC: NEGATIVE MG/DL
HCG INTACT+B SERPL-ACNC: 7642 MIU/ML
HCT VFR BLD AUTO: 33.9 % (ref 34–46.6)
HGB BLD-MCNC: 11.8 G/DL (ref 12–15.9)
HGB UR QL STRIP.AUTO: ABNORMAL
HOLD SPECIMEN: NORMAL
HOLD SPECIMEN: NORMAL
HYALINE CASTS UR QL AUTO: ABNORMAL /LPF
IMM GRANULOCYTES # BLD AUTO: 0.04 10*3/MM3 (ref 0–0.05)
IMM GRANULOCYTES NFR BLD AUTO: 0.4 % (ref 0–0.5)
KETONES UR QL STRIP: NEGATIVE
LEUKOCYTE ESTERASE UR QL STRIP.AUTO: ABNORMAL
LYMPHOCYTES # BLD AUTO: 3.24 10*3/MM3 (ref 0.7–3.1)
LYMPHOCYTES NFR BLD AUTO: 36.1 % (ref 19.6–45.3)
MCH RBC QN AUTO: 33.3 PG (ref 26.6–33)
MCHC RBC AUTO-ENTMCNC: 34.8 G/DL (ref 31.5–35.7)
MCV RBC AUTO: 95.8 FL (ref 79–97)
MONOCYTES # BLD AUTO: 0.44 10*3/MM3 (ref 0.1–0.9)
MONOCYTES NFR BLD AUTO: 4.9 % (ref 5–12)
NEUTROPHILS NFR BLD AUTO: 5.08 10*3/MM3 (ref 1.7–7)
NEUTROPHILS NFR BLD AUTO: 56.8 % (ref 42.7–76)
NITRITE UR QL STRIP: NEGATIVE
NRBC BLD AUTO-RTO: 0 /100 WBC (ref 0–0.2)
PH UR STRIP.AUTO: 7.5 [PH] (ref 5–9)
PLATELET # BLD AUTO: 346 10*3/MM3 (ref 140–450)
PMV BLD AUTO: 10.4 FL (ref 6–12)
POTASSIUM SERPL-SCNC: 3.6 MMOL/L (ref 3.5–5.2)
PROT SERPL-MCNC: 7.3 G/DL (ref 6–8.5)
PROT UR QL STRIP: NEGATIVE
RBC # BLD AUTO: 3.54 10*6/MM3 (ref 3.77–5.28)
RBC # UR STRIP: ABNORMAL /HPF
REF LAB TEST METHOD: ABNORMAL
RH BLD: POSITIVE
SODIUM SERPL-SCNC: 143 MMOL/L (ref 136–145)
SP GR UR STRIP: 1 (ref 1–1.03)
SQUAMOUS #/AREA URNS HPF: ABNORMAL /HPF
UROBILINOGEN UR QL STRIP: ABNORMAL
WBC # UR STRIP: ABNORMAL /HPF
WBC NRBC COR # BLD: 8.97 10*3/MM3 (ref 3.4–10.8)
WHOLE BLOOD HOLD COAG: NORMAL
WHOLE BLOOD HOLD SPECIMEN: NORMAL

## 2022-09-09 PROCEDURE — 80053 COMPREHEN METABOLIC PANEL: CPT | Performed by: STUDENT IN AN ORGANIZED HEALTH CARE EDUCATION/TRAINING PROGRAM

## 2022-09-09 PROCEDURE — 36415 COLL VENOUS BLD VENIPUNCTURE: CPT

## 2022-09-09 PROCEDURE — 76817 TRANSVAGINAL US OBSTETRIC: CPT

## 2022-09-09 PROCEDURE — 86900 BLOOD TYPING SEROLOGIC ABO: CPT | Performed by: STUDENT IN AN ORGANIZED HEALTH CARE EDUCATION/TRAINING PROGRAM

## 2022-09-09 PROCEDURE — 86901 BLOOD TYPING SEROLOGIC RH(D): CPT | Performed by: STUDENT IN AN ORGANIZED HEALTH CARE EDUCATION/TRAINING PROGRAM

## 2022-09-09 PROCEDURE — 99283 EMERGENCY DEPT VISIT LOW MDM: CPT

## 2022-09-09 PROCEDURE — 84702 CHORIONIC GONADOTROPIN TEST: CPT | Performed by: STUDENT IN AN ORGANIZED HEALTH CARE EDUCATION/TRAINING PROGRAM

## 2022-09-09 PROCEDURE — 85025 COMPLETE CBC W/AUTO DIFF WBC: CPT | Performed by: STUDENT IN AN ORGANIZED HEALTH CARE EDUCATION/TRAINING PROGRAM

## 2022-09-09 PROCEDURE — 81001 URINALYSIS AUTO W/SCOPE: CPT | Performed by: STUDENT IN AN ORGANIZED HEALTH CARE EDUCATION/TRAINING PROGRAM

## 2022-09-09 RX ORDER — SODIUM CHLORIDE 0.9 % (FLUSH) 0.9 %
10 SYRINGE (ML) INJECTION AS NEEDED
Status: DISCONTINUED | OUTPATIENT
Start: 2022-09-09 | End: 2022-09-09 | Stop reason: HOSPADM

## 2022-09-09 NOTE — ED NOTES
Patient request to leave AMA. Patient verbalizes understanding of risks of leaving to her and fetus. AMA forms signed

## 2022-09-09 NOTE — ED PROVIDER NOTES
Subjective   PIT    37-year-old female comes to the ER chief complaint of left flank pain that she rates a 4/10 describes it as an aching, vaginal bleeding and is passing clots that all started this morning.  Patient reports taking a couple pregnancy test at home a few days ago and came back positive.  She thinks she is about 10 to 12 weeks pregnant.  This is her second pregnancy.  Denies other symptoms, but wanted to come to the ER to be checked out given the bleeding.  She reports the bleeding has started to slow down.      History provided by:  Patient   used: No        Review of Systems   Constitutional: Negative for activity change, appetite change, chills and fever.   HENT: Negative for drooling.    Eyes: Negative for redness.   Respiratory: Negative for shortness of breath.    Cardiovascular: Negative for chest pain.   Gastrointestinal: Negative for abdominal pain, nausea and vomiting.   Genitourinary: Positive for flank pain and vaginal bleeding. Negative for dysuria, hematuria and vaginal discharge.   Skin: Negative for color change.   Neurological: Negative for seizures.   Psychiatric/Behavioral: Negative for confusion.       Past Medical History:   Diagnosis Date   • Encounter for  visit      visit status      • Surgical follow-up care    • Vomiting of pregnancy    • Vomiting of pregnancy, unspecified     Unspecified vomiting of pregnancy, antepartum condition or complication          Allergies   Allergen Reactions   • Latex Hives   • Amoxicillin Rash       Past Surgical History:   Procedure Laterality Date   •  SECTION  2015     Repeat low transverse  section.    • COLONOSCOPY N/A 2018    Procedure: COLONOSCOPY;  Surgeon: Kristofer Maldonado MD;  Location: [facility] ENDOSCOPY;  Service: Gastroenterology   • COLONOSCOPY N/A 2019    Procedure: COLONOSCOPY;  Surgeon: Kristofer Maldonado MD;  Location: [facility] ENDOSCOPY;  Service: Gastroenterology        Family History   Problem Relation Age of Onset   • Cancer Mother         colon cancer   • Cancer Father         Lung cancer   • Hypertension Paternal Grandmother    • Cancer Maternal Grandmother         lymphoma   • Heart disease Paternal Grandfather        Social History     Socioeconomic History   • Marital status:    Tobacco Use   • Smoking status: Never Smoker   • Smokeless tobacco: Never Used   Substance and Sexual Activity   • Alcohol use: No   • Drug use: Yes     Types: Marijuana     Comment: Pt hasnt smoked in the past 2 weeks   • Sexual activity: Yes     Birth control/protection: None           Objective    Vitals:    09/09/22 1442 09/09/22 1520 09/09/22 1546 09/09/22 1549   BP: 115/56 104/63 118/58    BP Location: Left arm      Patient Position: Sitting      Pulse: 100 98     Resp: 18 18     Temp:       TempSrc:       SpO2: 96% 99%  99%   Weight:       Height:           Physical Exam  Vitals and nursing note reviewed.   Constitutional:       General: She is not in acute distress.     Appearance: She is well-developed. She is not ill-appearing, toxic-appearing or diaphoretic.   HENT:      Head: Normocephalic.      Right Ear: External ear normal.      Left Ear: External ear normal.   Eyes:      General: No scleral icterus.     Conjunctiva/sclera: Conjunctivae normal.   Cardiovascular:      Rate and Rhythm: Normal rate.   Pulmonary:      Effort: Pulmonary effort is normal. No accessory muscle usage or respiratory distress.      Breath sounds: No wheezing.   Chest:      Chest wall: No tenderness.   Abdominal:      General: Bowel sounds are normal.      Palpations: Abdomen is soft.      Tenderness: There is no abdominal tenderness (deep palpation). There is no right CVA tenderness, left CVA tenderness, guarding or rebound.   Skin:     General: Skin is warm and dry.      Capillary Refill: Capillary refill takes less than 2 seconds.   Neurological:      Mental Status: She is alert and oriented to  person, place, and time.   Psychiatric:         Behavior: Behavior normal.         Procedures           ED Course      Results for orders placed or performed during the hospital encounter of 09/09/22   Comprehensive Metabolic Panel    Specimen: Blood   Result Value Ref Range    Glucose 90 65 - 99 mg/dL    BUN 4 (L) 6 - 20 mg/dL    Creatinine 0.57 0.57 - 1.00 mg/dL    Sodium 143 136 - 145 mmol/L    Potassium 3.6 3.5 - 5.2 mmol/L    Chloride 109 (H) 98 - 107 mmol/L    CO2 24.0 22.0 - 29.0 mmol/L    Calcium 9.1 8.6 - 10.5 mg/dL    Total Protein 7.3 6.0 - 8.5 g/dL    Albumin 4.30 3.50 - 5.20 g/dL    ALT (SGPT) 6 1 - 33 U/L    AST (SGOT) 11 1 - 32 U/L    Alkaline Phosphatase 87 39 - 117 U/L    Total Bilirubin 0.5 0.0 - 1.2 mg/dL    Globulin 3.0 gm/dL    A/G Ratio 1.4 g/dL    BUN/Creatinine Ratio 7.0 7.0 - 25.0    Anion Gap 10.0 5.0 - 15.0 mmol/L    eGFR 119.5 >60.0 mL/min/1.73   hCG, Quantitative, Pregnancy    Specimen: Blood   Result Value Ref Range    HCG Quantitative 7,642.00 mIU/mL   CBC Auto Differential    Specimen: Blood   Result Value Ref Range    WBC 8.97 3.40 - 10.80 10*3/mm3    RBC 3.54 (L) 3.77 - 5.28 10*6/mm3    Hemoglobin 11.8 (L) 12.0 - 15.9 g/dL    Hematocrit 33.9 (L) 34.0 - 46.6 %    MCV 95.8 79.0 - 97.0 fL    MCH 33.3 (H) 26.6 - 33.0 pg    MCHC 34.8 31.5 - 35.7 g/dL    RDW 13.8 12.3 - 15.4 %    RDW-SD 48.5 37.0 - 54.0 fl    MPV 10.4 6.0 - 12.0 fL    Platelets 346 140 - 450 10*3/mm3    Neutrophil % 56.8 42.7 - 76.0 %    Lymphocyte % 36.1 19.6 - 45.3 %    Monocyte % 4.9 (L) 5.0 - 12.0 %    Eosinophil % 1.4 0.3 - 6.2 %    Basophil % 0.4 0.0 - 1.5 %    Immature Grans % 0.4 0.0 - 0.5 %    Neutrophils, Absolute 5.08 1.70 - 7.00 10*3/mm3    Lymphocytes, Absolute 3.24 (H) 0.70 - 3.10 10*3/mm3    Monocytes, Absolute 0.44 0.10 - 0.90 10*3/mm3    Eosinophils, Absolute 0.13 0.00 - 0.40 10*3/mm3    Basophils, Absolute 0.04 0.00 - 0.20 10*3/mm3    Immature Grans, Absolute 0.04 0.00 - 0.05 10*3/mm3    nRBC 0.0 0.0 -  0.2 /100 WBC   Urinalysis With Microscopic If Indicated (No Culture) - Urine, Clean Catch    Specimen: Urine, Clean Catch   Result Value Ref Range    Color, UA Yellow Yellow, Straw, Dark Yellow, Payal    Appearance, UA Clear Clear    pH, UA 7.5 5.0 - 9.0    Specific Gravity, UA 1.002 (L) 1.003 - 1.030    Glucose, UA Negative Negative    Ketones, UA Negative Negative    Bilirubin, UA Negative Negative    Blood, UA Moderate (2+) (A) Negative    Protein, UA Negative Negative    Leuk Esterase, UA Trace (A) Negative    Nitrite, UA Negative Negative    Urobilinogen, UA 0.2 E.U./dL 0.2 - 1.0 E.U./dL   Urinalysis, Microscopic Only - Urine, Clean Catch    Specimen: Urine, Clean Catch   Result Value Ref Range    RBC, UA 0-2 (A) None Seen /HPF    WBC, UA 0-2 None Seen, 0-2, 3-5 /HPF    Bacteria, UA 2+ (A) None Seen /HPF    Squamous Epithelial Cells, UA 0-2 None Seen, 0-2 /HPF    Hyaline Casts, UA None Seen None Seen /LPF    Methodology Automated Microscopy    ABO / Rh    Specimen: Blood   Result Value Ref Range    ABO Type B     RH type Positive    Green Top (Gel)   Result Value Ref Range    Extra Tube Hold for add-ons.    Lavender Top   Result Value Ref Range    Extra Tube hold for add-on    Gold Top - SST   Result Value Ref Range    Extra Tube Hold for add-ons.    Light Blue Top   Result Value Ref Range    Extra Tube Hold for add-ons.                                       MDM  Number of Diagnoses or Management Options  Vaginal bleeding: new and requires workup  Diagnosis management comments: Vital signs are stable, afebrile.  Patient is Rh+.  She left the ER AMA prior to completion of her work-up.      Final diagnoses:   Vaginal bleeding       ED Disposition  ED Disposition     ED Disposition   AMA    Condition   --    Comment   --             No follow-up provider specified.       Medication List      No changes were made to your prescriptions during this visit.          Dudley Salamanca MD  09/09/22 [id number]

## 2022-09-15 ENCOUNTER — LAB (OUTPATIENT)
Dept: LAB | Facility: HOSPITAL | Age: 37
End: 2022-09-15

## 2022-09-15 ENCOUNTER — TELEPHONE (OUTPATIENT)
Dept: OBSTETRICS AND GYNECOLOGY | Facility: CLINIC | Age: 37
End: 2022-09-15

## 2022-09-15 ENCOUNTER — INITIAL PRENATAL (OUTPATIENT)
Dept: OBSTETRICS AND GYNECOLOGY | Facility: CLINIC | Age: 37
End: 2022-09-15

## 2022-09-15 VITALS — WEIGHT: 106.8 LBS | BODY MASS INDEX: 18.33 KG/M2 | SYSTOLIC BLOOD PRESSURE: 100 MMHG | DIASTOLIC BLOOD PRESSURE: 68 MMHG

## 2022-09-15 DIAGNOSIS — O20.9 BLEEDING IN EARLY PREGNANCY: Primary | ICD-10-CM

## 2022-09-15 LAB — HCG INTACT+B SERPL-ACNC: NORMAL MIU/ML

## 2022-09-15 PROCEDURE — 84702 CHORIONIC GONADOTROPIN TEST: CPT | Performed by: OBSTETRICS & GYNECOLOGY

## 2022-09-15 PROCEDURE — 36415 COLL VENOUS BLD VENIPUNCTURE: CPT

## 2022-09-15 NOTE — PROGRESS NOTES
The patient came in today for her first prenatal appointment. She had went to the ER on Friday, September 9th for vaginal bleeding. An ultrasound was done. She was 6 weeks gestation with  bpm. She was told that ultrasound looked good. She had more bleeding on Friday night. She had heavier bleeding on Saturday that was bright red in color. She did not have any bleeding on Sunday.  On Tuesday, she started bleeding again. Today, it is dark in color, more brownish. I consulted  Friday. We will do a beta HCG quant level and call her with results and instructions on followup. The patient verbalizes understanding. The patient is Rh positive, so a Rhogam is not needed.

## 2022-09-30 ENCOUNTER — LAB (OUTPATIENT)
Dept: LAB | Facility: HOSPITAL | Age: 37
End: 2022-09-30

## 2022-09-30 ENCOUNTER — INITIAL PRENATAL (OUTPATIENT)
Dept: OBSTETRICS AND GYNECOLOGY | Facility: CLINIC | Age: 37
End: 2022-09-30

## 2022-09-30 ENCOUNTER — ROUTINE PRENATAL (OUTPATIENT)
Dept: OBSTETRICS AND GYNECOLOGY | Facility: CLINIC | Age: 37
End: 2022-09-30

## 2022-09-30 VITALS — DIASTOLIC BLOOD PRESSURE: 68 MMHG | SYSTOLIC BLOOD PRESSURE: 112 MMHG | WEIGHT: 112.6 LBS | BODY MASS INDEX: 19.33 KG/M2

## 2022-09-30 DIAGNOSIS — O20.9 BLEEDING IN EARLY PREGNANCY: Primary | ICD-10-CM

## 2022-09-30 DIAGNOSIS — O03.9 SAB (SPONTANEOUS ABORTION): Primary | ICD-10-CM

## 2022-09-30 LAB — HCG INTACT+B SERPL-ACNC: 1800 MIU/ML

## 2022-09-30 PROCEDURE — 36415 COLL VENOUS BLD VENIPUNCTURE: CPT

## 2022-09-30 PROCEDURE — 84702 CHORIONIC GONADOTROPIN TEST: CPT | Performed by: NURSE PRACTITIONER

## 2022-09-30 PROCEDURE — 99213 OFFICE O/P EST LOW 20 MIN: CPT | Performed by: NURSE PRACTITIONER

## 2022-09-30 NOTE — PROGRESS NOTES
The patient comes in today for her prenatal appointment and ultrasound. She is accompanied by her . Patient saw me on September 15th. She had been seen in the ER on 9/9/22 for vaginal bleeding. She was still having bleeding on 9/15/22, so a repeat beta HCG quant level was done. The quant level had risen to 19,849, so a followup appointment and ultrasound was scheduled. She tells me today that she has still been bleeding intermittently since the 15th. She wears a pad. At times, the bleeding is heavy. The patient says she has passed something that looks like tissue. I consulted with Barbara CASTELLON. She will have a beta HCG quant level and ultrasound today. She will do her newob labs at her next appointment when she sees the provider. She is interested in genetic testing.  I updated her health and family history. All questions are answered today. I will call the patient with results.

## 2022-10-04 ENCOUNTER — TELEPHONE (OUTPATIENT)
Dept: OBSTETRICS AND GYNECOLOGY | Facility: CLINIC | Age: 37
End: 2022-10-04